# Patient Record
Sex: FEMALE | Race: BLACK OR AFRICAN AMERICAN | NOT HISPANIC OR LATINO | Employment: PART TIME | URBAN - METROPOLITAN AREA
[De-identification: names, ages, dates, MRNs, and addresses within clinical notes are randomized per-mention and may not be internally consistent; named-entity substitution may affect disease eponyms.]

---

## 2018-03-22 ENCOUNTER — HOSPITAL ENCOUNTER (EMERGENCY)
Facility: HOSPITAL | Age: 53
Discharge: HOME | End: 2018-03-23
Attending: EMERGENCY MEDICINE | Admitting: EMERGENCY MEDICINE
Payer: COMMERCIAL

## 2018-03-22 VITALS
OXYGEN SATURATION: 98 % | HEART RATE: 80 BPM | SYSTOLIC BLOOD PRESSURE: 124 MMHG | DIASTOLIC BLOOD PRESSURE: 68 MMHG | RESPIRATION RATE: 18 BRPM | TEMPERATURE: 98 F

## 2018-03-22 DIAGNOSIS — N39.0 URINARY TRACT INFECTION: Primary | ICD-10-CM

## 2018-03-22 DIAGNOSIS — R31.9 HEMATURIA: ICD-10-CM

## 2018-03-22 LAB
BACTERIA UR QL AUTO: ABNORMAL /HPF
BILIRUB UR QL STRIP: NEGATIVE
CLARITY UR: ABNORMAL
COLOR UR: ABNORMAL
GLUCOSE UR STRIP-MCNC: NEGATIVE MG/DL
HGB UR QL STRIP.AUTO: ABNORMAL
KETONES UR STRIP-MCNC: NEGATIVE MG/DL
LEUKOCYTE ESTERASE UR QL STRIP: ABNORMAL
NITRITE UR QL STRIP: NEGATIVE
NON-SQ EPI CELLS URNS QL MICRO: ABNORMAL /HPF
PH UR STRIP.AUTO: 6.5 [PH] (ref 4.5–8)
PROT UR STRIP-MCNC: ABNORMAL MG/DL
RBC #/AREA URNS AUTO: ABNORMAL /HPF
SP GR UR STRIP.AUTO: 1.02 (ref 1–1.03)
UROBILINOGEN UR QL STRIP.AUTO: 0.2 E.U./DL
WBC #/AREA URNS AUTO: ABNORMAL /HPF

## 2018-03-22 PROCEDURE — 87186 SC STD MICRODIL/AGAR DIL: CPT | Performed by: EMERGENCY MEDICINE

## 2018-03-22 PROCEDURE — 87086 URINE CULTURE/COLONY COUNT: CPT | Performed by: EMERGENCY MEDICINE

## 2018-03-22 PROCEDURE — 81001 URINALYSIS AUTO W/SCOPE: CPT | Performed by: EMERGENCY MEDICINE

## 2018-03-22 PROCEDURE — 87077 CULTURE AEROBIC IDENTIFY: CPT | Performed by: EMERGENCY MEDICINE

## 2018-03-22 RX ORDER — CEPHALEXIN 500 MG/1
500 CAPSULE ORAL 2 TIMES DAILY
Qty: 14 CAPSULE | Refills: 0 | Status: SHIPPED | OUTPATIENT
Start: 2018-03-22 | End: 2018-03-29

## 2018-03-22 RX ORDER — FAMOTIDINE 20 MG/1
20 TABLET, FILM COATED ORAL ONCE
Status: COMPLETED | OUTPATIENT
Start: 2018-03-22 | End: 2018-03-22

## 2018-03-22 RX ORDER — CEPHALEXIN 250 MG/1
500 CAPSULE ORAL ONCE
Status: COMPLETED | OUTPATIENT
Start: 2018-03-22 | End: 2018-03-22

## 2018-03-22 RX ORDER — FAMOTIDINE 20 MG/1
20 TABLET, FILM COATED ORAL 2 TIMES DAILY
Qty: 60 TABLET | Refills: 0 | Status: SHIPPED | OUTPATIENT
Start: 2018-03-22 | End: 2019-11-19

## 2018-03-22 RX ADMIN — CEPHALEXIN 500 MG: 250 CAPSULE ORAL at 23:51

## 2018-03-22 RX ADMIN — FAMOTIDINE 20 MG: 20 TABLET, FILM COATED ORAL at 23:51

## 2018-03-23 PROCEDURE — 99283 EMERGENCY DEPT VISIT LOW MDM: CPT

## 2018-03-23 NOTE — ED PROVIDER NOTES
History  Chief Complaint   Patient presents with    Blood in Urine     Pt c/o frequency and painful urination, blood in urine, lower abdominal pain   Hx of UTIS  59-year-old female with history of ulcerative colitis well controlled on Humira, does not smoke or drink is coming in with complaint of hematuria that started today  She has had some lower abdominal pressure with feeling that she needs to void but no actual abdominal pain and has had urinary urgency, frequency and hesitancy  Has sense of incomplete voiding like she could go to the bathroom again  Has had urinary tract infections in the past but none recently  Patient is on an immunologic and more prone to general infections  She has had no fever, chills, vomiting or diarrhea  She sees her GI doctor next week  Her only other complaint is some epigastric reflux symptoms that are worse with eating and drinking at times  Patient used to be on Protonix but stopped taking it because she did want to be on some new medications  Sometimes taking medications to worsen her reflux so we will put patient back on something like Pepcid  Urine dip in the emergency department shows large blood and leukocytes  She has no associated back or flank pain to consider kidney stone and has no associated abdominal tenderness  Discussed with her treatment for UTI and following up with her GI doctor and primary care doctor and worsening for any signs and symptoms to the emergency department  History provided by:  Patient  Blood in Urine   This is a new problem  The current episode started today  The problem is unchanged  She describes the hematuria as gross hematuria  The hematuria occurs throughout her entire urinary stream  She reports no clotting in her urine stream  The pain is mild  She describes her urine color as light pink  Irritative symptoms include frequency and urgency  Obstructive symptoms include incomplete emptying   Associated symptoms include dysuria and hesitancy  Pertinent negatives include no abdominal pain, chills, fever, flank pain, inability to urinate, nausea or vomiting  There is no history of kidney stones, recent infection or tobacco use  (On humira for UC)       None       Past Medical History:   Diagnosis Date    Ulcerative colitis ([facility] [address] )        History reviewed  No pertinent surgical history  History reviewed  No pertinent family history  I have reviewed and agree with the history as documented  Social History   Substance Use Topics    Smoking status: Never Smoker    Smokeless tobacco: Never Used    Alcohol use Yes      Comment: occ        Review of Systems   Constitutional: Negative for chills and fever  Gastrointestinal: Negative for abdominal pain, nausea and vomiting  Genitourinary: Positive for dysuria, frequency, hematuria, hesitancy, incomplete emptying and urgency  Negative for flank pain  All other systems reviewed and are negative  Physical Exam  ED Triage Vitals [03/22/18 2203]   Temperature Pulse Respirations Blood Pressure SpO2   98 °F (36 7 °C) 80 18 124/68 98 %      Temp Source Heart Rate Source Patient Position - Orthostatic VS BP Location FiO2 (%)   Oral Monitor Sitting Left arm --      Pain Score       --           Orthostatic Vital Signs  Vitals:    03/22/18 2203   BP: 124/68   Pulse: 80   Patient Position - Orthostatic VS: Sitting       Physical Exam   Constitutional: She appears well-developed and well-nourished  No distress  HENT:   Head: Normocephalic and atraumatic  Cardiovascular: Normal rate and regular rhythm  Pulmonary/Chest: Effort normal and breath sounds normal    Abdominal: Soft  Bowel sounds are normal  She exhibits no distension  There is no tenderness  Neurological: She is alert  Skin: She is not diaphoretic         ED Medications  Medications   famotidine (PEPCID) tablet 20 mg (not administered)   cephalexin (KEFLEX) capsule 500 mg (not administered)       Diagnostic Studies  Results Reviewed     Procedure Component Value Units Date/Time    UA w Reflex to Microscopic w Reflex to Culture [[id number]] Collected:  03/22/18 9601    Lab Status:  No result Specimen:  Urine from Urine, Clean Catch                  No orders to display              Procedures  Procedures       Phone Contacts  ED Phone Contact    ED Course  ED Course                                MDM  Number of Diagnoses or Management Options  Hematuria: new and requires workup  Urinary tract infection: new and requires workup     Amount and/or Complexity of Data Reviewed  Clinical lab tests: ordered and reviewed    Patient Progress  Patient progress: stable    CritCare Time    Disposition  Final diagnoses:   Urinary tract infection   Hematuria     Time reflects when diagnosis was documented in both MDM as applicable and the Disposition within this note     Time User Action Codes Description Comment    3/22/2018 11:26 PM Belgica Levnory FITO Add [N39 0] Urinary tract infection     3/22/2018 11:26 PM [address] [R31 9] Hematuria       ED Disposition     ED Disposition Condition Comment    Discharge  [facility] discharge to home/self care      Condition at discharge: Good        Follow-up Information     Follow up With Specialties Details Why Contact Info Additional Information    Keep your appointment GI next week         [facility] Emergency Department Emergency Medicine   [address] [address]  [phone] [place] ED, [address] [place] [address], [address]        Patient's Medications   Discharge Prescriptions    CEPHALEXIN (KEFLEX) 500 MG CAPSULE    Take 1 capsule (500 mg total) by mouth 2 (two) times a day for 7 days       Start Date: 3/22/2018 End Date: 3/29/2018       Order Dose: 500 mg       Quantity: 14 capsule    Refills: 0    FAMOTIDINE (PEPCID) 20 MG TABLET    Take 1 tablet (20 mg total) by mouth 2 (two) times a day for 30 days       Start Date: 3/22/2018 End Date: 4/21/2018       Order Dose: 20 mg       Quantity: 60 tablet    Refills: 0     No discharge procedures on file      ED Provider  Electronically Signed by           Aj Vargas MD  03/22/18 6893

## 2018-03-23 NOTE — DISCHARGE INSTRUCTIONS
Acute Hematuria   WHAT YOU SHOULD KNOW:   Hematuria is blood in your urine from an injury or medical condition  Acute means the problem starts suddenly, worsens quickly, and lasts a short time  Your urine may be bright red to dark brown  Some common causes of hematuria are bladder infection, kidney stone, trauma to the kidneys or bladder, and some medications  Sometimes blood clots can block the urethra so that you cannot empty your bladder  AFTER YOU LEAVE:   Medicines:  Ask about these or other medicines you may need to treat the cause of your acute hematuria:  · Antibiotics: This medicine is given to fight or prevent an infection caused by bacteria  Always take your antibiotics exactly as ordered by your healthcare provider  Do not stop taking your medicine unless directed by your healthcare provider  Never save antibiotics or take leftover antibiotics that were given to you for another illness  · Take your medicine as directed  Call your healthcare provider if you think your medicine is not helping or if you have side effects  Tell him if you are allergic to any medicine  Keep a list of the medicines, vitamins, and herbs you take  Include the amounts, and when and why you take them  Bring the list or the pill bottles to follow-up visits  Carry your medicine list with you in case of an emergency  Increase the amount of fluid you drink:  Drink clear fluids to help flush the blood from your urinary tract  Follow instructions about how much fluid to drink  Follow up with your healthcare provider as directed: Your healthcare provider will tell you how often to come in for follow-up visits  He may refer you to a specialist, such as a urologist or nephrologist  The specialists may do tests or procedures to find more serious problems with your urinary system  Write down your questions so you remember to ask them during your visits     Contact your healthcare provider if:   · You have a fever that gets worse or does not go away with treatment  · You cannot keep liquids or medicines down  · Your urine gets darker, even after you drink extra liquids  · You have questions or concerns about your condition, treatment, or care  Seek care immediately or call 911 if:   · You have blood in your urine after a new injury, such as a fall  · You are urinating very small amounts or not at all  · You feel like you cannot empty your bladder  · You have severe back or side pain that does not go away with treatment  © 2014 [address] is for End User's use only and may not be sold, redistributed or otherwise used for commercial purposes  All illustrations and images included in CareNotes® are the copyrighted property of A D A M , [employer]  or Mitchel [employer]  The above information is an  only  It is not intended as medical advice for individual conditions or treatments  Talk to your doctor, nurse or pharmacist before following any medical regimen to see if it is safe and effective for you  Urinary Tract Infection in Women, Ambulatory Care   GENERAL INFORMATION:   A urinary tract infection (UTI)  is caused by bacteria that get inside your urinary tract  Most bacteria that enter your urinary tract are expelled when you urinate  If the bacteria stay in your urinary tract, you may get an infection  Your urinary tract includes your kidneys, ureters, bladder, and urethra  Urine is made in your kidneys, and it flows from the ureters to the bladder  Urine leaves the bladder through the urethra  A UTI is more common in your lower urinary tract, which includes your bladder and urethra          Common symptoms include the following:   · Urinating more often or waking from sleep to urinate    · Pain or burning when you urinate    · Pain or pressure in your lower abdomen     · Urine that smells bad    · Blood in your urine    · Leaking urine  Seek immediate care for the following symptoms:   · Urinating very little or not at all    · Vomiting    · Shaking chills with a fever    · Side or back pain that gets worse  Treatment for a UTI  may include medicines to treat a bacterial infection  You may also need medicines to decrease pain and burning, or decrease the urge to urinate often  Prevent a UTI:   · Urinate when you feel the urge  Do not hold your urine  Urinate as soon as you feel you have to  · Drink liquids as directed  Ask how much liquid to drink each day and which liquids are best for you  You may need to drink more fluids than usual to help flush out the bacteria  Do not drink alcohol, caffeine, and citrus juices  These can irritate your bladder and increase your symptoms  · Apply heat  on your abdomen for 20 to 30 minutes every 2 hours for as many days as directed  Heat helps decrease discomfort and pressure in your bladder  Follow up with your healthcare provider as directed:  Write down your questions so you remember to ask them during your visits  CARE AGREEMENT:   You have the right to help plan your care  Learn about your health condition and how it may be treated  Discuss treatment options with your caregivers to decide what care you want to receive  You always have the right to refuse treatment  The above information is an  only  It is not intended as medical advice for individual conditions or treatments  Talk to your doctor, nurse or pharmacist before following any medical regimen to see if it is safe and effective for you  © 2014 [address] is for End User's use only and may not be sold, redistributed or otherwise used for commercial purposes  All illustrations and images included in CareNotes® are the copyrighted property of A D A M , Inc  or Mitchel Leonard

## 2018-03-25 LAB — BACTERIA UR CULT: ABNORMAL

## 2019-01-14 ENCOUNTER — TELEPHONE (OUTPATIENT)
Dept: GASTROENTEROLOGY | Facility: CLINIC | Age: 54
End: 2019-01-14

## 2019-01-14 NOTE — TELEPHONE ENCOUNTER
Attempted to contact pt to cancel appt on 1/16/19 do to credentialing problem , unable to reach v/m full

## 2019-01-16 ENCOUNTER — TELEPHONE (OUTPATIENT)
Dept: GASTROENTEROLOGY | Facility: CLINIC | Age: 54
End: 2019-01-16

## 2019-01-16 NOTE — TELEPHONE ENCOUNTER
PT SWITCHED INSURANCE POLICIES FROM [employer] TO [employer]   SHES GOING TO NEED A NEW PA FOR HUMIRA STARTED PLEASE

## 2019-01-23 ENCOUNTER — TELEPHONE (OUTPATIENT)
Dept: GASTROENTEROLOGY | Facility: CLINIC | Age: 54
End: 2019-01-23

## 2019-01-23 NOTE — TELEPHONE ENCOUNTER
Could not LMOM pt VM box is full  Tried to call pt and advise her that we were waiting for paper work and that her PA was submitted, hopefully we hear back by the end of the week

## 2019-01-30 ENCOUNTER — TELEPHONE (OUTPATIENT)
Dept: GASTROENTEROLOGY | Facility: CLINIC | Age: 54
End: 2019-01-30

## 2019-01-30 DIAGNOSIS — K51.00 ULCERATIVE PANCOLITIS WITHOUT COMPLICATION (HCC): Primary | ICD-10-CM

## 2019-01-30 NOTE — TELEPHONE ENCOUNTER
Pt called, she got notification that her Humira was approved  Can you send the script to the mail order pharmacy that works with [address] [place]  Pt cannot remember the name of the pharmacy   Thank you

## 2019-01-31 ENCOUNTER — TELEPHONE (OUTPATIENT)
Dept: GASTROENTEROLOGY | Facility: CLINIC | Age: 54
End: 2019-01-31

## 2019-01-31 NOTE — TELEPHONE ENCOUNTER
Dr Areli Red Patient called  The Pharmacy patient would like to have prescription for humira sent to is [facility] [phone]   Any questions, please call patient at [phone]

## 2019-01-31 NOTE — TELEPHONE ENCOUNTER
[facility] number to call is [phone] to set up delivery to pt  Verbal was given (to pharmacy voice mail as per transferred since no pharmacist was available )  Humira pen 40 mb/0 08 ml , qty 2, duration, 28 days approved from 1/24/19 to 1/24/20  Also,LM with office info to call if any question

## 2019-02-01 ENCOUNTER — TELEPHONE (OUTPATIENT)
Dept: GASTROENTEROLOGY | Facility: CLINIC | Age: 54
End: 2019-02-01

## 2019-02-01 NOTE — TELEPHONE ENCOUNTER
Dr Marcial Ayala called  Needs further action in regard to patient's Humira Medication  Pharmacy is requesting the following:   [de-identified] NPI Number, Address and a detailed prescription along with a  VERBAL authorization    Please call Ciera Avila at [phone] ty

## 2019-02-04 ENCOUNTER — TELEPHONE (OUTPATIENT)
Dept: GASTROENTEROLOGY | Facility: CLINIC | Age: 54
End: 2019-02-04

## 2019-02-04 NOTE — TELEPHONE ENCOUNTER
Called pt and LMOM advising that I spoke to [facility]  and provided a verbal script for her Humira medication

## 2019-02-04 NOTE — TELEPHONE ENCOUNTER
Dr Sisi Camejo - Patient called  [facility] has to get a verbal to fill humira prescription for patient  [phone]   Please call patient [phone]

## 2019-02-04 NOTE — TELEPHONE ENCOUNTER
Spoke to [employer]  and provided verbal script of Humira 40 mg/ 0 8 ml SQ Inject every other week with 11 refills      Approval range 1/24/19 - 1/19/20

## 2019-02-22 RX ORDER — ALPRAZOLAM 0.5 MG/1
0.5 TABLET ORAL 3 TIMES DAILY PRN
COMMUNITY
Start: 2018-08-14 | End: 2019-11-19

## 2019-02-22 RX ORDER — PANTOPRAZOLE SODIUM 40 MG/1
40 TABLET, DELAYED RELEASE ORAL
COMMUNITY
Start: 2016-12-14 | End: 2019-07-19 | Stop reason: SDUPTHER

## 2019-02-27 ENCOUNTER — OFFICE VISIT (OUTPATIENT)
Dept: GASTROENTEROLOGY | Facility: CLINIC | Age: 54
End: 2019-02-27
Payer: COMMERCIAL

## 2019-02-27 VITALS — SYSTOLIC BLOOD PRESSURE: 122 MMHG | HEART RATE: 72 BPM | WEIGHT: 127.2 LBS | DIASTOLIC BLOOD PRESSURE: 84 MMHG

## 2019-02-27 DIAGNOSIS — K21.9 GASTROESOPHAGEAL REFLUX DISEASE WITHOUT ESOPHAGITIS: ICD-10-CM

## 2019-02-27 DIAGNOSIS — R14.0 BLOATING: Primary | ICD-10-CM

## 2019-02-27 DIAGNOSIS — K51.919 ULCERATIVE COLITIS WITH COMPLICATION, UNSPECIFIED LOCATION (HCC): ICD-10-CM

## 2019-02-27 PROCEDURE — 99214 OFFICE O/P EST MOD 30 MIN: CPT | Performed by: INTERNAL MEDICINE

## 2019-02-27 RX ORDER — CLOBETASOL PROPIONATE 0.5 MG/G
CREAM TOPICAL
COMMUNITY

## 2019-02-27 RX ORDER — NAPROXEN 500 MG/1
TABLET ORAL
COMMUNITY
End: 2019-11-19

## 2019-02-27 NOTE — PROGRESS NOTES
[facility] - Outpatient Follow-up Note  Anika Hearing 48 y o  female MRN: [id number]  Encounter: [id number]          ASSESSMENT AND PLAN:      1  Bloating      2  Ulcerative colitis with complication, unspecified location ([facility] )      3  Gastroesophageal reflux disease without esophagitis    - Continue Humira  - Continue Lomotil PRN  - Will see her back in 3-4 months   ______________________________________________________________________    SUBJECTIVE:    48year old female with UC who is doing well on Humira every other week  She is reporting bloating and gas and she is going to restart her probiotic  She is denying rectal bleeding or melena  No nausea or vomiting  Her weight is stable  REVIEW OF SYSTEMS IS OTHERWISE NEGATIVE  Historical Information   Past Medical History:   Diagnosis Date    Anxiety     Colitis     Ulcerative colitis ([place] [address] )      Past Surgical History:   Procedure Laterality Date    COLONOSCOPY       Social History   Social History     Substance and Sexual Activity   Alcohol Use Yes    Comment: occ     Social History     Substance and Sexual Activity   Drug Use No     Social History     Tobacco Use   Smoking Status Never Smoker   Smokeless Tobacco Never Used     Family History   Problem Relation Age of Onset    No Known Problems Mother     Stroke Father     Hypertension Father        Meds/Allergies       Current Outpatient Medications:     Adalimumab (HUMIRA PEN) 40 MG/0 8ML PNKT    ALPRAZolam (XANAX) 0 5 mg tablet    clobetasol (TEMOVATE) 0 05 % cream    Diphenoxylate-Atropine (LOMOTIL PO)    naproxen (NAPROSYN) 500 mg tablet    pantoprazole (PROTONIX) 40 mg tablet    adalimumab (HUMIRA) 40 mg/0 8 mL PSKT    famotidine (PEPCID) 20 mg tablet    Allergies   Allergen Reactions    Thimerosal Hives           Objective     Blood pressure 122/84, pulse 72, weight 57 7 kg (127 lb 3 2 oz)   There is no height or weight on file to calculate BMI       PHYSICAL EXAM:      General Appearance:   Alert, cooperative, no distress   HEENT:   Normocephalic, atraumatic, anicteric      Neck:  Supple, symmetrical, trachea midline   Lungs:   Clear to auscultation bilaterally; no rales, rhonchi or wheezing; respirations unlabored    Heart[de-identified]   Regular rate and rhythm; no murmur, rub, or gallop  Abdomen:   Soft, non-tender, non-distended; normal bowel sounds; no masses, no organomegaly    Genitalia:   Deferred    Rectal:   Deferred    Extremities:  No cyanosis, clubbing or edema    Pulses:  2+ and symmetric    Skin:  No jaundice, rashes, or lesions    Lymph nodes:  No palpable cervical lymphadenopathy        Lab Results:   No visits with results within 1 Day(s) from this visit  Latest known visit with results is:   Admission on 03/22/2018, Discharged on 03/23/2018   Component Date Value    Color, UA 03/22/2018 Red     Clarity, UA 03/22/2018 Cloudy     Specific Gravity, UA 03/22/2018 1 025     pH, UA 03/22/2018 6 5     Leukocytes, UA 03/22/2018 Large*    Nitrite, UA 03/22/2018 Negative     Protein, UA 03/22/2018 30 (1+)*    Glucose, UA 03/22/2018 Negative     Ketones, UA 03/22/2018 Negative     Urobilinogen, UA 03/22/2018 0 2     Bilirubin, UA 03/22/2018 Negative     Blood, UA 03/22/2018 Large*    RBC, UA 03/22/2018 Innumerable*    WBC, UA 03/22/2018 10-20*    Epithelial Cells 03/22/2018 Occasional     Bacteria, UA 03/22/2018 Moderate*    Urine Culture 03/22/2018 80,000-89,000 cfu/ml Escherichia coli*         Radiology Results:   No results found

## 2019-02-27 NOTE — LETTER
February 27, 2019     Les Perez PA-C  [address]  [address] [address]    Patient: Lizette Sanon   YOB: 1965   Date of Visit: 2/27/2019       Dear Dr Dewayne Toney: Thank you for referring Lizette Sanon to me for evaluation  Below are my notes for this consultation  If you have questions, please do not hesitate to call me  I look forward to following your patient along with you  Sincerely,        Landry Sharp DO        CC: No Recipients  Landry Sharp DO  2/27/2019  9:43 AM  Sign at close encounter  [facility] - Outpatient Follow-up Note  Lizette Sanon 48 y o  female MRN: [id number]  Encounter: [id number]          ASSESSMENT AND PLAN:      1  Bloating      2  Ulcerative colitis with complication, unspecified location ([place] [address] )      3  Gastroesophageal reflux disease without esophagitis    - Continue Humira  - Continue Lomotil PRN  - Will see her back in 3-4 months   ______________________________________________________________________    SUBJECTIVE:    48year old female with UC who is doing well on Humira every other week  She is reporting bloating and gas and she is going to restart her probiotic  She is denying rectal bleeding or melena  No nausea or vomiting  Her weight is stable  REVIEW OF SYSTEMS IS OTHERWISE NEGATIVE        Historical Information   Past Medical History:   Diagnosis Date    Anxiety     Colitis     Ulcerative colitis ([facility] )      Past Surgical History:   Procedure Laterality Date    COLONOSCOPY       Social History   Social History     Substance and Sexual Activity   Alcohol Use Yes    Comment: occ     Social History     Substance and Sexual Activity   Drug Use No     Social History     Tobacco Use   Smoking Status Never Smoker   Smokeless Tobacco Never Used     Family History   Problem Relation Age of Onset    No Known Problems Mother     Stroke Father     Hypertension Father        Meds/Allergies Current Outpatient Medications:     Adalimumab (HUMIRA PEN) 40 MG/0 8ML PNKT    ALPRAZolam (XANAX) 0 5 mg tablet    clobetasol (TEMOVATE) 0 05 % cream    Diphenoxylate-Atropine (LOMOTIL PO)    naproxen (NAPROSYN) 500 mg tablet    pantoprazole (PROTONIX) 40 mg tablet    adalimumab (HUMIRA) 40 mg/0 8 mL PSKT    famotidine (PEPCID) 20 mg tablet    Allergies   Allergen Reactions    Thimerosal Hives           Objective     Blood pressure 122/84, pulse 72, weight 57 7 kg (127 lb 3 2 oz)  There is no height or weight on file to calculate BMI  PHYSICAL EXAM:      General Appearance:   Alert, cooperative, no distress   HEENT:   Normocephalic, atraumatic, anicteric      Neck:  Supple, symmetrical, trachea midline   Lungs:   Clear to auscultation bilaterally; no rales, rhonchi or wheezing; respirations unlabored    Heart[de-identified]   Regular rate and rhythm; no murmur, rub, or gallop  Abdomen:   Soft, non-tender, non-distended; normal bowel sounds; no masses, no organomegaly    Genitalia:   Deferred    Rectal:   Deferred    Extremities:  No cyanosis, clubbing or edema    Pulses:  2+ and symmetric    Skin:  No jaundice, rashes, or lesions    Lymph nodes:  No palpable cervical lymphadenopathy        Lab Results:   No visits with results within 1 Day(s) from this visit     Latest known visit with results is:   Admission on 03/22/2018, Discharged on 03/23/2018   Component Date Value    Color, UA 03/22/2018 Red     Clarity, UA 03/22/2018 Cloudy     Specific Gravity, UA 03/22/2018 1 025     pH, UA 03/22/2018 6 5     Leukocytes, UA 03/22/2018 Large*    Nitrite, UA 03/22/2018 Negative     Protein, UA 03/22/2018 30 (1+)*    Glucose, UA 03/22/2018 Negative     Ketones, UA 03/22/2018 Negative     Urobilinogen, UA 03/22/2018 0 2     Bilirubin, UA 03/22/2018 Negative     Blood, UA 03/22/2018 Large*    RBC, UA 03/22/2018 Innumerable*    WBC, UA 03/22/2018 10-20*    Epithelial Cells 03/22/2018 Occasional     Bacteria, UA 03/22/2018 Moderate*    Urine Culture 03/22/2018 80,000-89,000 cfu/ml Escherichia coli*         Radiology Results:   No results found

## 2019-05-15 ENCOUNTER — OFFICE VISIT (OUTPATIENT)
Dept: GASTROENTEROLOGY | Facility: CLINIC | Age: 54
End: 2019-05-15
Payer: COMMERCIAL

## 2019-05-15 VITALS
HEIGHT: 59 IN | WEIGHT: 125.8 LBS | DIASTOLIC BLOOD PRESSURE: 78 MMHG | SYSTOLIC BLOOD PRESSURE: 112 MMHG | BODY MASS INDEX: 25.36 KG/M2 | HEART RATE: 66 BPM

## 2019-05-15 DIAGNOSIS — K51.919 ULCERATIVE COLITIS WITH COMPLICATION, UNSPECIFIED LOCATION (HCC): Primary | ICD-10-CM

## 2019-05-15 PROCEDURE — 99213 OFFICE O/P EST LOW 20 MIN: CPT | Performed by: PHYSICIAN ASSISTANT

## 2019-05-15 RX ORDER — IBUPROFEN 600 MG/1
TABLET ORAL
Refills: 0 | COMMUNITY
Start: 2019-04-11

## 2019-05-15 RX ORDER — HYDROCODONE BITARTRATE AND ACETAMINOPHEN 5; 325 MG/1; MG/1
1 TABLET ORAL EVERY 4 HOURS PRN
Refills: 0 | COMMUNITY
Start: 2019-04-11 | End: 2019-11-19

## 2019-07-19 ENCOUNTER — TELEPHONE (OUTPATIENT)
Dept: GASTROENTEROLOGY | Facility: CLINIC | Age: 54
End: 2019-07-19

## 2019-07-19 DIAGNOSIS — K21.9 GASTROESOPHAGEAL REFLUX DISEASE, ESOPHAGITIS PRESENCE NOT SPECIFIED: Primary | ICD-10-CM

## 2019-07-19 RX ORDER — PANTOPRAZOLE SODIUM 40 MG/1
40 TABLET, DELAYED RELEASE ORAL DAILY
Qty: 90 TABLET | Refills: 3 | Status: SHIPPED | OUTPATIENT
Start: 2019-07-19 | End: 2020-04-01 | Stop reason: SDUPTHER

## 2019-08-20 ENCOUNTER — OFFICE VISIT (OUTPATIENT)
Dept: OTOLARYNGOLOGY | Facility: CLINIC | Age: 54
End: 2019-08-20
Payer: COMMERCIAL

## 2019-08-20 VITALS
HEART RATE: 62 BPM | HEIGHT: 59 IN | BODY MASS INDEX: 25.4 KG/M2 | DIASTOLIC BLOOD PRESSURE: 72 MMHG | WEIGHT: 126 LBS | SYSTOLIC BLOOD PRESSURE: 118 MMHG

## 2019-08-20 DIAGNOSIS — M26.609 TMJ (TEMPOROMANDIBULAR JOINT SYNDROME): ICD-10-CM

## 2019-08-20 DIAGNOSIS — R49.0 DYSPHONIA: Primary | ICD-10-CM

## 2019-08-20 DIAGNOSIS — R49.8 VOICE STRAIN: ICD-10-CM

## 2019-08-20 PROCEDURE — 99243 OFF/OP CNSLTJ NEW/EST LOW 30: CPT | Performed by: OTOLARYNGOLOGY

## 2019-08-20 PROCEDURE — 31575 DIAGNOSTIC LARYNGOSCOPY: CPT | Performed by: OTOLARYNGOLOGY

## 2019-08-20 NOTE — PROGRESS NOTES
Consultation - Otolaryngology - Head and Neck Surgery  Facial Plastic and Reconstructive Surgery  [facility] 48 y o  female MRN: [id number]  Encounter: [id number]        Assessment/Plan:  1  Dysphonia  Ambulatory referral to Speech Therapy   2  Voice strain     3  TMJ (temporomandibular joint syndrome)           Temporomandibular joint syndrome: We discussed soft diet for 2 weeks, appropriate use of NSAIDs for 1 week, warm compresses, massage and PT for TMJ therapy  Will refer to PT for TMJ and follow up PRN  Laryngoscopy today well appearing with erythema of the arytenoids and vocal cords  Otherwise no nodules or polyps  Discussed that symptoms are likely due to heavy voice use  Recommend voice rest and speech therapy  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 in a few weeks and will proceed with speech therapy around that time  Follow up in 3-4 months for re evaluation, sooner with any additional concerns  History of Present Illness   Physician Requesting Consult: Ericka Nieto PA-C  Reason for Consult / Principal Problem: Voice  HPI: [facility] is a 48y o  year old female who presents with loss of voice  She states symptoms have been ongoing for some time now  She works at [employer] and has heavy voice use while rafting and speaking on the phone  She relates that she had similar issues in the past over 10 years ago and had informal speech therapy which seemed to help her symptoms  She also relates intermittent bilateral ear pain  She admits to frequent jaw clenching with stress and anxiety  No otorrhea or hearing changes  Review of systems:  ROS was performed by the MA and documented in the attached note  This was reviewed personally      Historical Information   Past Medical History:   Diagnosis Date    Anxiety     Colitis     Ulcerative colitis ([facility] [address] )      Past Surgical History:   Procedure Laterality Date    COLONOSCOPY      TOOTH EXTRACTION       Social History Social History     Substance and Sexual Activity   Alcohol Use Yes    Comment: occ     Social History     Substance and Sexual Activity   Drug Use No     Social History     Tobacco Use   Smoking Status Never Smoker   Smokeless Tobacco Never Used     Family History:   Family History   Problem Relation Age of Onset    No Known Problems Mother     Stroke Father     Hypertension Father        Current Outpatient Medications on File Prior to Visit   Medication Sig    Adalimumab (HUMIRA PEN) 40 MG/0 8ML PNKT     ALPRAZolam (XANAX) 0 5 mg tablet Take 0 5 mg by mouth Three times daily as needed    clobetasol (TEMOVATE) 0 05 % cream clobetasol 0 05 % topical cream    Diphenoxylate-Atropine (LOMOTIL PO) Lomotil   QHS    famotidine (PEPCID) 20 mg tablet Take 1 tablet (20 mg total) by mouth 2 (two) times a day for 30 days    ibuprofen (MOTRIN) 600 mg tablet take 1 tablet by mouth every 6 hours for THE FIRST 48 HOURS then if needed for pain    pantoprazole (PROTONIX) 40 mg tablet Take 1 tablet (40 mg total) by mouth daily    adalimumab (HUMIRA) 40 mg/0 8 mL PSKT Inject 0 8 mL (40 mg total) under the skin once for 1 dose    HYDROcodone-acetaminophen (NORCO) 5-325 mg per tablet Take 1 tablet by mouth every 4 (four) hours as needed    naproxen (NAPROSYN) 500 mg tablet naproxen 500 mg tablet     No current facility-administered medications on file prior to visit  Allergies   Allergen Reactions    Thimerosal Hives       Vitals:    08/20/19 1011   BP: 118/72   Pulse: 62       Physical Exam   Constitutional: Oriented to person, place, and time  Well-developed and well-nourished, no apparent distress, non-toxic appearance  Cooperative, able to hear and answer questions without difficulty  Voice: Normal voice quality  Head: Normocephalic, atraumatic  No scars, masses or lesions  Face: Symmetric, no edema, no sinus tenderness  Eyes: Vision grossly intact, extra-ocular movement intact    Ears: External ears normal  Tympanic membranes intact with intact normal landmarks  No post-auricular erythema or tenderness  Nose: Septum intact, nares clear  Mucosa moist, turbinates well appearing  No crusting, polyps or discharge evident  Oral cavity: Dentition intact  Bilateral linea alba of buccal mucosa  Mucosa moist, lips without lesions or masses  Tongue mobile, floor of mouth soft and flat  Hard palate intact  No masses or lesions  Oropharynx: Uvula is midline, soft palate intact without lesion or mass  Oropharyngeal inlet without obstruction  Tonsils unremarkable  Posterior pharyngeal wall clear  No masses or lesions  Salivary glands:  Parotid glands and submandibular glands symmetric, no enlargement or tenderness  Neck: Normal laryngeal elevation with swallow  Trachea midline  No masses or lesions  No palpable adenopathy  Thyroid: Without tenderness or palpable nodules  Pulmonary/Chest: Normal effort and rate  No respiratory distress  No stertor or stridor  Musculoskeletal: Normal range of motion  Neurological: Cranial nerves 2-12 intact  Skin: Skin is warm and dry  Psychiatric: Normal mood and affect  Procedure: Flexible fiberoptic laryngoscopy    Indications: Evaluate areas of the upper aerodigestive tract not seen on physical exam    Procedure in detail: After informed verbal consent was obtained the nose was anesthetized using 4% lidocaine and neosynephrine spray  After adequate time for anesthesia the scope was guided easily along the nasal cavity floor and into the nasopharynx  The nasopharynx, oropharynx, hypopharynx and larynx were evaluated with the below listed findings  The scope was removed without difficulty and the patient tolerated the procedure well  Findings: No significant mucoid purulence or polyposis in the nasal cavity  No lesions or masses of the nasopharynx, oropharynx, hypopharynx, or larynx  Bilateral vocal folds are fully mobile  Airway is widely patent   No lesions or masses of the subglottis  Bilateral arytenoids erythematous  Imaging Studies: I have personally reviewed pertinent reports  Lab Results: I have personally reviewed pertinent lab results  Greater than 40 minutes were spent in consultation  More than 1/2 of that time was spent in counselling and coordination of care

## 2019-08-20 NOTE — PROGRESS NOTES
Review of Systems   Constitutional: Negative  HENT: Positive for ear pain, sore throat, trouble swallowing and voice change  Eyes: Negative  Respiratory: Negative  Cardiovascular: Negative  Gastrointestinal: Negative  Endocrine: Negative  Genitourinary: Negative  Musculoskeletal: Negative  Skin: Negative  Allergic/Immunologic: Positive for food allergies (dairy)  Negative for environmental allergies  Neurological: Positive for dizziness and headaches  Hematological: Negative  Psychiatric/Behavioral: Negative

## 2019-10-03 DIAGNOSIS — R49.0 DYSPHONIA: Primary | ICD-10-CM

## 2019-10-10 ENCOUNTER — EVALUATION (OUTPATIENT)
Dept: SPEECH THERAPY | Facility: CLINIC | Age: 54
End: 2019-10-10
Payer: COMMERCIAL

## 2019-10-10 DIAGNOSIS — R49.0 DYSPHONIA: ICD-10-CM

## 2019-10-10 PROCEDURE — 92524 BEHAVRAL QUALIT ANALYS VOICE: CPT

## 2019-10-10 NOTE — PROGRESS NOTES
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 and began losing her voice, she also experienced vocal pain  She saw an ENT at that time and had an endoscopy performed  It was determined that she had vocal nodules  She had speech therapy at that time and her symptoms resolved  In  she began working as a  at [employer], 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[employer] [place]  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  Currently she speaks in multiple capacities including large groups, speeches, phone calls, and year round vocal demand  Her speaking demands include projection and increased loudness for river guide work  Prior Functional Status:  She reports that her vocal quality in session today has improved to a place where she is less hoarse and in less overall pain  She has been without guiding demands for about one month  She also goes without greeting obligations per her boss when her voice is unable to do so  Medical History significant for:   Past Medical History:   Diagnosis Date    Anxiety     Colitis     Ulcerative colitis ([facility] [address] )      Clinically Complex Situations:  Patient reports that she will be working weekends only from this weekend until after [place]  Hearing:  WFL  Vision:  Patient wears glasses      Medication List:   Current Outpatient Medications   Medication Sig Dispense Refill    Adalimumab (HUMIRA PEN) 40 MG/0 8ML PNKT       adalimumab (HUMIRA) 40 mg/0 8 mL PSKT Inject 0 8 mL (40 mg total) under the skin once for 1 dose 0 4 mL 0    ALPRAZolam (XANAX) 0 5 mg tablet Take 0 5 mg by mouth Three times daily as needed      clobetasol (TEMOVATE) 0 05 % cream clobetasol 0 05 % topical cream      Diphenoxylate-Atropine (LOMOTIL PO) Lomotil   QHS      famotidine (PEPCID) 20 mg tablet Take 1 tablet (20 mg total) by mouth 2 (two) times a day for 30 days 60 tablet 0    HYDROcodone-acetaminophen (NORCO) 5-325 mg per tablet Take 1 tablet by mouth every 4 (four) hours as needed  0    ibuprofen (MOTRIN) 600 mg tablet take 1 tablet by mouth every 6 hours for THE FIRST 48 HOURS then if needed for pain  0    naproxen (NAPROSYN) 500 mg tablet naproxen 500 mg tablet      pantoprazole (PROTONIX) 40 mg tablet Take 1 tablet (40 mg total) by mouth daily 90 tablet 3     No current facility-administered medications for this visit  Allergies: Allergies   Allergen Reactions    Thimerosal Hives     Primary Language: English    Home Environment/ Lifestyle:  Patient reports that she lives with a dog and a cat  She sold her home in June and moved into a small apartment here in [place]  Current / Prior Services being received:  Patient reporting she had speech therapy [id number] and has not had speech therapy services  Mental Status:  [facility]  Behavior Status:  [facility]  Communication Modalities:  Expressive language  Recent Speech/ Language therapy:  None since 2002  Rehabilitation Prognosis:  Good    VOICE EVALUATION:  -Onset: 2001     -Voice history:  Nodules in [id number], resolved  Patient presents with similar symptoms yearly     -Occupational/vocal demands:  Patient's vocal demand is very high, see summary     -Water intake:  Patient reports that she is "terrible" at drinking enough water    She is aware of it and does well throughout the summer     -Caffeine intake:  Patient reports that she has 1-2 cups of coffee per day  -Alcohol intake:  Patient reports that her boyfriend brews beer at home but recently has been on an alcohol hiatus at this time     -Smoking:  Never    -Throat clearing/coughing:  Patient reports that she does not at this time but throughout the summer she does  -Previous voice tx?:  2002 for a short period of time, her vocal nodules receded at that time  She was noted to have vocal demands and decreased breath support  1  Voice Handicap Index (VHI): The VHI is a list of 30 statements that many people have used to describe their voices and the effects of their voices on their lives  Patient indicated how frequently she has the same experience using a rating point scale (never = 0, almost never = 1, sometimes = 2, almost always = 3, and always = 4)  Goal will be established  2  Consensus Auditory Perceptual Evaluation of Voice (CAPE-V): The CAPE-V rates auditory-perceptual qualities of a patients voice  The overall severity, roughness, breathiness, strain, pitch and loudness are rated during several tasks including general conversation, vowel prolongation, and reading of sentences  Patient also read the [place] to assess the coordination of respiration and voice production  The ratings of the abovementioned parameters were plotted on a 100-millimeter scale, with 0 corresponding to typical normal voice and 100 indicating a moderate-severely deviant voice      Parameter: Rating: Severity & Perceptual Observations:   *Overall Severity Roughness 40/100 moderate   Roughness 40/100 moderate   Breathiness 70/100 Moderate-severe   Strain 80/100 Moderate-severe   Pitch 60/100 moderate   Loudness 50/100 moderate   Focus of Resonance Oral moderate       RESPIRATORY EFFICIENCY:   3  S:Z Ratio Task: Patient was instructed to sustain the sounds /s/ and /z/ across three trials to examine the coordination and efficiency of respiration and voice production  Normative data suggests that adults can prolong these sounds for 20-25 seconds  Ratios of 1 4 and above are consistent with laryngeal inefficiency, and ratios of 2 0 and above are suggestive of vocal fold pathology  Task: Trial 1 (sec): Trial 2 (sec): Trial 3 (sec):   /s/ 32 30 30   /z/ 12 21 12 53 12 41     Best /s/:  32  Best /z/:  12 53     Ratio:  2 55      4  Maximum Phonation Time: Patient was instructed to sustain the sound /ah/ across three trials to measure respiratory and laryngeal coordination and efficiency  Adults are typically able to prolong these vowels sound for 15-20 seconds  Reduced MPT may suggest poor respiratory support, or poor medial glottal closure  Trial 1 (sec): Trial 2 (sec): Trial 3 (sec):   5 89 6 75 6 30     Average Duration: 6 31 seconds      Goals  Short Term Goals:  1  Patient will complete voice handicap index in order to determine baseline impact  2   Patient will be educated on effects on the voice including medication, GERD, diet, lifestyle and behavior  3   Patient will be provided exercises in order to decrease overall muscle tension  4   Patient will be educated on breathing exercises appropriate for relaxation of speaking muscles  5   Patient will be evaluated as a candidate for myofacial release therapy 2' reported pain, tenderness and soreness in the cervical neck/spine  6   Patient will be taught [address]  93  figures of retraction and anchor for appropriate speaking voice  7   Patient will discuss work modifications to assist in vocal recovery  Long Term Goals:  1  Patient will improve her overall vocal quality to baseline measure  Impressions/ Recommendations  Patient presents with a moderate-severely deviant vocal quality today which as reported by her has "improved a lot" since labor day weekend as her work load has decreased  Patient demonstrates breathy, strained, tight vocal quality with intermittent hoarseness and pitch breaks    She describes her voice as being painful, sore and that it "doesn't recover" the way it used to in the past   Through past medical history gathered today it is evident the patient has undergone vocal trauma at least 18+ years, likely longer  Her poor vocal hygiene coupled with chronic vocal abuse has given way to at least one known case of vocal cord nodules and likely some form of polyps, varices or scarring 2' vocal quality  In evaluation today, patient was found to be very tight, compensating to amplify her voice, something her job requires a great deal of in the busy season  Patient was educated on various vocal abuse behaviors and vocal hygiene throughout assessment  Vocal abuses included decreased water and increased caffeine intake, coughing and throat-clearing, thoracic/clavicular breathing, straining voice, whispering  Vocal hygiene strategies included increased water intake, decreased caffeine intake, throat-clearing awareness, increased breath support/diaphragmatic breathing, compensatory strategies to minimize vocal abuses during work day, confidential voice  Patient was agreeable       Recommendations:   Patients would benefit from: Voice therapy   Frequency:1-2x weekly   Duration:4-5 weeks    Intervention certification from:  62/58/78  Intervention certification to:  64/53/81

## 2019-10-17 ENCOUNTER — OFFICE VISIT (OUTPATIENT)
Dept: SPEECH THERAPY | Facility: CLINIC | Age: 54
End: 2019-10-17
Payer: COMMERCIAL

## 2019-10-17 DIAGNOSIS — R49.0 DYSPHONIA: Primary | ICD-10-CM

## 2019-10-17 PROCEDURE — 92507 TX SP LANG VOICE COMM INDIV: CPT

## 2019-10-17 NOTE — PROGRESS NOTES
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 and "lost her voice" however by  she was no longer guiding and reports that her voice had "mostly recovered" by Monday  Objective:  1  Patient will complete voice handicap index in order to determine baseline impact  10/17:  Goal met  Baseline measures of VHI were Functional-10 Physical-25 Emotional-9  2  Patient will be educated on effects on the voice including medication, GERD, diet, lifestyle and behavior  10/17:  Goal met, education and hand outs provided  3   Patient will be provided exercises in order to decrease overall muscle tension  10/17:  Patient provided cervical spine exercises to reduce tension  4   Patient will be educated on breathing exercises appropriate for relaxation of speaking muscles  10/17:  Patient provided exercises to assist in relaxation technique and breath support  5   Patient will be evaluated as a candidate for myofacial release therapy 2' reported pain, tenderness and soreness in the cervical neck/spine  6   Patient will be taught [address]  figures of retraction and anchor for appropriate speaking voice  7   Patient will discuss work modifications to assist in vocal recovery  Assessment:  Patient revealing in conversation today she suffers from a lot of neck and shoulder pain 2' job requirements    Patient paddles, moves boats with people in them, manipulates tubes, etc  She reports that she is always "tender" near her clavicles  Patient reporting that she does use medications on the list and consume items on the GERD diet precautions list   She reports that she avoids most of these items 2' GI history and health  Patient has a great deal of stress and anxiety, medicated, that can be contributing to her overall vocal health/quality      Plan:Voice therapy   Frequency:1-2x weekly   Duration:4-5 weeks    Homework:  10/17:  Cervical exercises    Intervention Cycle:  Intervention certification from:  71/78/66  Intervention certification to:  74/14/82    Visit: 2 / 24

## 2019-11-19 ENCOUNTER — OFFICE VISIT (OUTPATIENT)
Dept: OTOLARYNGOLOGY | Facility: CLINIC | Age: 54
End: 2019-11-19
Payer: COMMERCIAL

## 2019-11-19 VITALS
BODY MASS INDEX: 25.8 KG/M2 | DIASTOLIC BLOOD PRESSURE: 70 MMHG | SYSTOLIC BLOOD PRESSURE: 122 MMHG | WEIGHT: 128 LBS | HEIGHT: 59 IN | HEART RATE: 67 BPM

## 2019-11-19 DIAGNOSIS — K21.9 GASTROESOPHAGEAL REFLUX DISEASE, ESOPHAGITIS PRESENCE NOT SPECIFIED: ICD-10-CM

## 2019-11-19 DIAGNOSIS — R49.0 DYSPHONIA: Primary | ICD-10-CM

## 2019-11-19 DIAGNOSIS — R49.8 VOICE STRAIN: ICD-10-CM

## 2019-11-19 PROCEDURE — 99213 OFFICE O/P EST LOW 20 MIN: CPT | Performed by: OTOLARYNGOLOGY

## 2019-11-19 RX ORDER — CLONAZEPAM 0.5 MG/1
0.5 TABLET ORAL
Refills: 0 | COMMUNITY
Start: 2019-11-13

## 2019-11-19 RX ORDER — VENLAFAXINE 75 MG/1
75 TABLET ORAL DAILY
Refills: 0 | COMMUNITY
Start: 2019-11-13 | End: 2021-08-10

## 2019-11-19 NOTE — PROGRESS NOTES
Gosia Serrano is a 47 y  o female who presents for re-evaluation of hoarseness  Since her prior visit, she had an evaluation session with speech therapy  Speech therapy had recommended that she have 1-2 sessions per week for 4-5 weeks  However, she was able to follow up with this because she went on vacation  She feels her voice has improved somewhat with the use of exercises taught to her by speech therapy  She will continue to follow with speech therapy now that she has returned from vacation  She also has a history of acid reflux and takes Protonix as needed  Intermittent heartburn  Past Medical History:   Diagnosis Date    Anxiety     Colitis     Ulcerative colitis ([facility] [address] )        /70 (BP Location: Left arm, Patient Position: Sitting, Cuff Size: Standard)   Pulse 67   Ht 4' 11" (1 499 m)   Wt 58 1 kg (128 lb)   BMI 25 85 kg/m²       Physical Exam   Constitutional: Oriented to person, place, and time  Well-developed and well-nourished, no apparent distress, non-toxic appearance  Cooperative, able to hear and answer questions without difficulty  Voice: Normal voice quality  Head: Normocephalic, atraumatic  No scars, masses or lesions  Face: Symmetric, no edema, no sinus tenderness  Eyes: Vision grossly intact, extra-ocular movement intact  Ears: External ear normal   Bilateral tympanic membranes are intact with intact normal landmarks  No post-auricular erythema or tenderness  Nose: Septum midline, nares clear  Mucosa moist, turbinates well appearing  No crusting, polyps or discharge evident  Oral cavity: Dentition intact  Mucosa moist, lips normal   Tongue mobile, floor of mouth normal   Hard palate unremarkable  No masses or lesions  Oropharynx: Uvula is midline, soft palate normal   Unremarkable oropharyngeal inlet  Tonsils unremarkable  Posterior pharyngeal wall clear  No masses or lesions    Salivary glands:  Parotid glands and submandibular glands symmetric, no enlargement or tenderness  Neck: Normal laryngeal elevation with swallow  Trachea midline  No masses or lesions  No palpable adenopathy  Thyroid: normal in size, unremarkable without tenderness or palpable nodules  Pulmonary/Chest: Normal effort and rate  No respiratory distress  Musculoskeletal: Normal range of motion  Neurological: Cranial nerves 2-12 intact  Skin: Skin is warm and dry  Psychiatric: Normal mood and affect  A/P:  I still suspect that her dysphonia is due to voice overuse  She had some improvement in the interim with use of speech therapy exercises and will now be continued to follow with speech therapy  We discussed that reflux may also be playing a role and worsening or hoarseness  She has intermittent heartburn for which she takes Protonix as needed  We discussed options of using Protonix regularly for possible LPR  At this point she agrees to continue following with speech therapy and will take Protonix regularly for approximately 1 month  I recommend she follow up in 3 months for re-evaluation of this will be in to ski season at which time she will be using her voice fairly regularly and heavily

## 2020-01-03 ENCOUNTER — OCCMED (OUTPATIENT)
Dept: URGENT CARE | Facility: CLINIC | Age: 55
End: 2020-01-03
Payer: OTHER MISCELLANEOUS

## 2020-01-03 ENCOUNTER — APPOINTMENT (OUTPATIENT)
Dept: RADIOLOGY | Facility: CLINIC | Age: 55
End: 2020-01-03
Payer: OTHER MISCELLANEOUS

## 2020-01-03 DIAGNOSIS — M54.50 ACUTE LEFT-SIDED LOW BACK PAIN WITHOUT SCIATICA: ICD-10-CM

## 2020-01-03 DIAGNOSIS — M54.50 ACUTE LEFT-SIDED LOW BACK PAIN WITHOUT SCIATICA: Primary | ICD-10-CM

## 2020-01-03 PROCEDURE — 99283 EMERGENCY DEPT VISIT LOW MDM: CPT | Performed by: PHYSICIAN ASSISTANT

## 2020-01-03 PROCEDURE — G0382 LEV 3 HOSP TYPE B ED VISIT: HCPCS | Performed by: PHYSICIAN ASSISTANT

## 2020-01-03 PROCEDURE — 72100 X-RAY EXAM L-S SPINE 2/3 VWS: CPT

## 2020-01-07 ENCOUNTER — APPOINTMENT (OUTPATIENT)
Dept: URGENT CARE | Facility: CLINIC | Age: 55
End: 2020-01-07
Payer: OTHER MISCELLANEOUS

## 2020-01-07 ENCOUNTER — TELEPHONE (OUTPATIENT)
Dept: GASTROENTEROLOGY | Facility: CLINIC | Age: 55
End: 2020-01-07

## 2020-01-07 PROCEDURE — 99213 OFFICE O/P EST LOW 20 MIN: CPT | Performed by: PHYSICIAN ASSISTANT

## 2020-01-09 ENCOUNTER — DOCUMENTATION (OUTPATIENT)
Dept: SPEECH THERAPY | Facility: CLINIC | Age: 55
End: 2020-01-09

## 2020-01-09 DIAGNOSIS — R49.0 DYSPHONIA: Primary | ICD-10-CM

## 2020-01-09 NOTE — TELEPHONE ENCOUNTER
rvcd fax from [facility] 72 40 mg/0 4 ml quantity 2, duration 28 approved for 12 months from 1/9/2020 to 1/9/2021  Pharmacy:  [facility]

## 2020-01-09 NOTE — PROGRESS NOTES
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[place], "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  This also requires speaking loudly over crowds and other noises  She has a history of shoulder injury and reports favor of that shoulder and cervical spine  This strain and compensation likely also effects her overall vocal cord care  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 and , again utilizing her voice for hours on end each day, often multiple days in a row  It is suspected that Ms Abdias Villar is suffering from a variation of muscle tension dysphonia characterized by the cervical and shoulder based tension she carries, used also as compensation for amplification, abusive hours and practice over multiple years  Patient has hallmark signs of MTD including pain, tenderness, soreness, loss of voice, vocal quality changes, tension, strained vocal quality, and vocal recovery after a few days rest   Likely the patient's voice is not able to recover as quickly and readily as it has in the past which is one of the reasons she sought speech therapy  Ms Abdias Villar was seen for one session of therapy before leaving on vacation for a two week period    Prior to leaving for vacation patient was educated extensively on her vocally abusive behaviors, vocal hygiene recommendations, breathing techniques to ensure appropriate breath support, and the plan of care for future sessions  Upon her return from vacation in early November 2019, patient was contacted to schedule follow up and a message was left  The patient called to set up an appt for 12/12/19 @ 8 am and no-showed  A call was placed to the patient with no return phone call  She is appropriate to be discharged at this time      Short Term Goals at the Time of Discharge:  1  Patient will complete voice handicap index in order to determine baseline impact  2   Patient will be educated on effects on the voice including medication, GERD, diet, lifestyle and behavior  3   Patient will be provided exercises in order to decrease overall muscle tension  4   Patient will be educated on breathing exercises appropriate for relaxation of speaking muscles  5   Patient will be evaluated as a candidate for myofacial release therapy 2' reported pain, tenderness and soreness in the cervical neck/spine  6   Patient will be taught [address]  figures of retraction and anchor for appropriate speaking voice  7   Patient will discuss work modifications to assist in vocal recovery  Discharge Information:  Patient's packet provided to her during her last treatment session should be utilized on a day to day basis  Recommend she continue with speech therapy for appropriate application and further education/strategies  Participation in Treatment (at discharge):   Cooperative    Patient is discharged to [address] [place] [address] name/phone number for follow up: [phone]

## 2020-01-10 NOTE — TELEPHONE ENCOUNTER
Called [facility] spoke to April gave a verbal script for Humira 40mg/0 4mg citrate free  They will process and contact pt to schedule delivery      Pt was previously on humira 40mg/0 8ML

## 2020-01-17 ENCOUNTER — APPOINTMENT (OUTPATIENT)
Dept: URGENT CARE | Facility: CLINIC | Age: 55
End: 2020-01-17
Payer: OTHER MISCELLANEOUS

## 2020-01-17 PROCEDURE — 99213 OFFICE O/P EST LOW 20 MIN: CPT

## 2020-01-28 ENCOUNTER — APPOINTMENT (OUTPATIENT)
Dept: URGENT CARE | Facility: CLINIC | Age: 55
End: 2020-01-28
Payer: OTHER MISCELLANEOUS

## 2020-01-28 ENCOUNTER — OFFICE VISIT (OUTPATIENT)
Dept: URGENT CARE | Facility: CLINIC | Age: 55
End: 2020-01-28
Payer: OTHER MISCELLANEOUS

## 2020-01-28 VITALS
DIASTOLIC BLOOD PRESSURE: 80 MMHG | SYSTOLIC BLOOD PRESSURE: 127 MMHG | HEART RATE: 71 BPM | TEMPERATURE: 98.1 F | OXYGEN SATURATION: 99 % | RESPIRATION RATE: 18 BRPM

## 2020-01-28 DIAGNOSIS — H92.01 EAR PAIN, RIGHT: ICD-10-CM

## 2020-01-28 DIAGNOSIS — H69.81 EUSTACHIAN TUBE DYSFUNCTION, RIGHT: Primary | ICD-10-CM

## 2020-01-28 PROCEDURE — G0382 LEV 3 HOSP TYPE B ED VISIT: HCPCS | Performed by: FAMILY MEDICINE

## 2020-01-28 PROCEDURE — 99283 EMERGENCY DEPT VISIT LOW MDM: CPT | Performed by: FAMILY MEDICINE

## 2020-01-28 RX ORDER — AMOXICILLIN 875 MG/1
875 TABLET, COATED ORAL 2 TIMES DAILY
Qty: 20 TABLET | Refills: 0 | Status: SHIPPED | OUTPATIENT
Start: 2020-01-28 | End: 2020-02-07

## 2020-01-28 RX ORDER — PREDNISONE 10 MG/1
TABLET ORAL
Qty: 15 TABLET | Refills: 0 | Status: SHIPPED | OUTPATIENT
Start: 2020-01-28 | End: 2021-08-10

## 2020-01-28 NOTE — PATIENT INSTRUCTIONS
Right ear pain most likely due to eustachian tube dysfunction  No evidence of current bacterial infection on exam   Trial of nasal steroid such as Flonase OTC or generic fluticasone  For the airplane flight you may try prednisone 10 mg-1 tablet by mouth up to 3 times a day for up to 5 days as needed for congestion/pain  If there is worsening redness of the throat, worsening ear pain, fevers or chills start amoxicillin 875 mg-  Take 1 tablet by mouth twice daily for 10 days

## 2020-01-28 NOTE — PROGRESS NOTES
[facility]        NAME: Carlos Velazquez is a 47 y o  female  : 1965    MRN: [id number]  DATE: 2020  TIME: 9:00 AM    Assessment and Plan   Eustachian tube dysfunction, right [H69 81]  1  Eustachian tube dysfunction, right  predniSONE 10 mg tablet    amoxicillin (AMOXIL) 875 mg tablet   2  Ear pain, right  amoxicillin (AMOXIL) 875 mg tablet         Patient Instructions       Follow up with PCP in 3-5 days  Proceed to  ER if symptoms worsen  Chief Complaint     Chief Complaint   Patient presents with    Earache     Pt c/o right ear pain for three days  History of Present Illness       PATIENT PRESENTS WITH RIGHT EAR PAIN FOR 3 DAYS  THERE IS SOME ASSOCIATED MILD COUGH AND SORE THROAT  THE RIGHT EAR PAIN RADIATES TO THE THROAT  NO FEVERS OR CHILLS  PATIENT TAKES OVER-THE-COUNTER IBUPROFEN WITH NO OVERT IMPROVEMENT  Review of Systems   Review of Systems   Constitutional: Negative for chills, diaphoresis, fatigue and fever  HENT: Positive for ear pain (Right side) and sore throat  Negative for ear discharge, rhinorrhea and sinus pain  Respiratory: Positive for cough  Musculoskeletal: Positive for myalgias  Hematological: Negative for adenopathy           Current Medications       Current Outpatient Medications:     Adalimumab (HUMIRA PEN) 40 MG/0 8ML PNKT, , Disp: , Rfl:     adalimumab (HUMIRA) 40 mg/0 8 mL PSKT, Inject 0 8 mL (40 mg total) under the skin once for 1 dose, Disp: 0 4 mL, Rfl: 0    amoxicillin (AMOXIL) 875 mg tablet, Take 1 tablet (875 mg total) by mouth 2 (two) times a day for 10 days, Disp: 20 tablet, Rfl: 0    clobetasol (TEMOVATE) 0 05 % cream, clobetasol 0 05 % topical cream, Disp: , Rfl:     clonazePAM (KlonoPIN) 0 5 mg tablet, Take 0 5 mg by mouth daily at bedtime, Disp: , Rfl: 0    Diphenoxylate-Atropine (LOMOTIL PO), Lomotil  QHS, Disp: , Rfl:     ibuprofen (MOTRIN) 600 mg tablet, take 1 tablet by mouth every 6 hours for THE FIRST 50 HOURS then if needed for pain, Disp: , Rfl: 0    pantoprazole (PROTONIX) 40 mg tablet, Take 1 tablet (40 mg total) by mouth daily, Disp: 90 tablet, Rfl: 3    predniSONE 10 mg tablet, 1 tab by mouth 3 times a day for 5 days, Disp: 15 tablet, Rfl: 0    venlafaxine (EFFEXOR) 75 mg tablet, Take 75 mg by mouth daily, Disp: , Rfl: 0    Current Allergies     Allergies as of 01/28/2020 - Reviewed 01/28/2020   Allergen Reaction Noted    Thimerosal Hives 03/22/2018            The following portions of the patient's history were reviewed and updated as appropriate: allergies, current medications, past family history, past medical history, past social history, past surgical history and problem list      Past Medical History:   Diagnosis Date    Anxiety     Colitis     Ulcerative colitis ([facility] [address] )        Past Surgical History:   Procedure Laterality Date    COLONOSCOPY      TOOTH EXTRACTION         Family History   Problem Relation Age of Onset    No Known Problems Mother     Stroke Father     Hypertension Father          Medications have been verified  Objective   /80   Pulse 71   Temp 98 1 °F (36 7 °C)   Resp 18   SpO2 99%        Physical Exam     Physical Exam   Constitutional: She appears well-developed and well-nourished  No distress  HENT:   Left Ear: External ear normal    Mouth/Throat: No oropharyngeal exudate  Left tympanic membrane and ear canal normal, right tympanic membrane is somewhat bulging but this clear with no redness  No drainage  Canal is normal   Posterior pharynx with mild erythema but no tonsillar enlargement or pus  Eyes: Pupils are equal, round, and reactive to light  Conjunctivae and EOM are normal  Right eye exhibits no discharge  Left eye exhibits no discharge  Neck: No tracheal deviation present  No thyromegaly present  Cardiovascular: Normal rate, regular rhythm and normal heart sounds  Exam reveals no friction rub  No murmur heard    Pulmonary/Chest: Effort normal and breath sounds normal  No stridor  No respiratory distress  She has no wheezes  She has no rales  Lymphadenopathy:     She has no cervical adenopathy  Skin: She is not diaphoretic

## 2020-02-24 ENCOUNTER — APPOINTMENT (OUTPATIENT)
Dept: URGENT CARE | Facility: CLINIC | Age: 55
End: 2020-02-24
Payer: OTHER MISCELLANEOUS

## 2020-02-24 PROCEDURE — 99213 OFFICE O/P EST LOW 20 MIN: CPT | Performed by: FAMILY MEDICINE

## 2020-03-31 ENCOUNTER — TELEPHONE (OUTPATIENT)
Dept: GASTROENTEROLOGY | Facility: CLINIC | Age: 55
End: 2020-03-31

## 2020-03-31 RX ORDER — CITALOPRAM 10 MG/1
5 TABLET ORAL DAILY
COMMUNITY
Start: 2020-02-10

## 2020-03-31 NOTE — TELEPHONE ENCOUNTER
Can you put it on the schedule for a virtual and then put telephone next to [facility]

## 2020-04-01 ENCOUNTER — TELEMEDICINE (OUTPATIENT)
Dept: GASTROENTEROLOGY | Facility: CLINIC | Age: 55
End: 2020-04-01
Payer: COMMERCIAL

## 2020-04-01 DIAGNOSIS — K21.9 GASTROESOPHAGEAL REFLUX DISEASE, ESOPHAGITIS PRESENCE NOT SPECIFIED: Primary | ICD-10-CM

## 2020-04-01 DIAGNOSIS — K51.919 ULCERATIVE COLITIS WITH COMPLICATION, UNSPECIFIED LOCATION (HCC): ICD-10-CM

## 2020-04-01 PROCEDURE — 99213 OFFICE O/P EST LOW 20 MIN: CPT | Performed by: PHYSICIAN ASSISTANT

## 2020-04-01 RX ORDER — PANTOPRAZOLE SODIUM 40 MG/1
40 TABLET, DELAYED RELEASE ORAL DAILY
Qty: 90 TABLET | Refills: 3 | Status: SHIPPED | OUTPATIENT
Start: 2020-04-01 | End: 2021-07-16 | Stop reason: SDUPTHER

## 2020-06-26 ENCOUNTER — TELEPHONE (OUTPATIENT)
Dept: GASTROENTEROLOGY | Facility: CLINIC | Age: 55
End: 2020-06-26

## 2020-08-19 ENCOUNTER — TELEPHONE (OUTPATIENT)
Dept: GASTROENTEROLOGY | Facility: CLINIC | Age: 55
End: 2020-08-19

## 2020-08-19 NOTE — TELEPHONE ENCOUNTER
Deneen pt-  [facility]  Caitlin Steele Patient has been experiencing hives in the last month     She has D/C'D her Humira because of the hives    Please phone patient @ [phone]  [facility] -[phone]

## 2020-09-10 ENCOUNTER — OFFICE VISIT (OUTPATIENT)
Dept: GASTROENTEROLOGY | Facility: CLINIC | Age: 55
End: 2020-09-10
Payer: COMMERCIAL

## 2020-09-10 VITALS
WEIGHT: 127.6 LBS | DIASTOLIC BLOOD PRESSURE: 70 MMHG | HEART RATE: 81 BPM | HEIGHT: 59 IN | TEMPERATURE: 97.2 F | SYSTOLIC BLOOD PRESSURE: 124 MMHG | BODY MASS INDEX: 25.72 KG/M2

## 2020-09-10 DIAGNOSIS — L50.9 HIVES: ICD-10-CM

## 2020-09-10 DIAGNOSIS — K51.919 ULCERATIVE COLITIS WITH COMPLICATION, UNSPECIFIED LOCATION (HCC): Primary | ICD-10-CM

## 2020-09-10 PROCEDURE — 99213 OFFICE O/P EST LOW 20 MIN: CPT | Performed by: PHYSICIAN ASSISTANT

## 2020-09-10 RX ORDER — DIPHENOXYLATE HCL/ATROPINE 2.5-.025/5
LIQUID (ML) ORAL
COMMUNITY
End: 2021-08-10

## 2020-09-10 RX ORDER — DICYCLOMINE HCL 20 MG
20 TABLET ORAL EVERY 6 HOURS
Qty: 60 TABLET | Refills: 6 | Status: SHIPPED | OUTPATIENT
Start: 2020-09-10

## 2020-09-10 RX ORDER — DIPHENOXYLATE HYDROCHLORIDE AND ATROPINE SULFATE 2.5; .025 MG/1; MG/1
2 TABLET ORAL 4 TIMES DAILY PRN
Qty: 60 TABLET | Refills: 3 | Status: SHIPPED | OUTPATIENT
Start: 2020-09-10 | End: 2021-10-11 | Stop reason: SDUPTHER

## 2020-09-10 NOTE — PATIENT INSTRUCTIONS
Abdominal Pain   WHAT YOU NEED TO KNOW:   Abdominal pain can be dull, achy, or sharp  You may have pain in one area of your abdomen, or in your entire abdomen  Your pain may be caused by a condition such as constipation, food sensitivity or poisoning, infection, or a blockage  Abdominal pain can also be from a hernia, appendicitis, or an ulcer  Liver, gallbladder, or kidney conditions can also cause abdominal pain  The cause of your abdominal pain may be unknown  DISCHARGE INSTRUCTIONS:   Return to the emergency department if:   · You have new chest pain or shortness of breath  · You have pulsing pain in your upper abdomen or lower back that suddenly becomes constant  · Your pain is in the right lower abdominal area and worsens with movement  · You have a fever over 100 4°F (38°C) or shaking chills  · You are vomiting and cannot keep food or liquids down  · Your pain does not improve or gets worse over the next 8 to 12 hours  · You see blood in your vomit or bowel movements, or they look black and tarry  · Your skin or the whites of your eyes turn yellow  · You are a woman and have a large amount of vaginal bleeding that is not your monthly period  Contact your healthcare provider if:   · You have pain in your lower back  · You are a man and have pain in your testicles  · You have pain when you urinate  · You have questions or concerns about your condition or care  Follow up with your healthcare provider within 24 hours or as directed:  Write down your questions so you remember to ask them during your visits  Medicines:   · Medicines  may be given to calm your stomach and prevent vomiting or to decrease pain  Ask how to take pain medicine safely  · Take your medicine as directed  Contact your healthcare provider if you think your medicine is not helping or if you have side effects  Tell him of her if you are allergic to any medicine   Keep a list of the medicines, vitamins, and herbs you take  Include the amounts, and when and why you take them  Bring the list or the pill bottles to follow-up visits  Carry your medicine list with you in case of an emergency  © 2017 [facility] Information is for End User's use only and may not be sold, redistributed or otherwise used for commercial purposes  All illustrations and images included in CareNotes® are the copyrighted property of A D A M , Inc  or Mitchel Leonard  The above information is an  only  It is not intended as medical advice for individual conditions or treatments  Talk to your doctor, nurse or pharmacist before following any medical regimen to see if it is safe and effective for you

## 2020-09-10 NOTE — LETTER
September 10, 2020     Rosa Angulo PA-C  [address] [facility]  [address] [facility]  [address] [address]    Patient: Sonia Officer   YOB: 1965   Date of Visit: 9/10/2020       Dear Dr Eduardo Duque: Thank you for referring Sonia Officer to me for evaluation  Below are my notes for this consultation  If you have questions, please do not hesitate to call me  I look forward to following your patient along with you  Sincerely,        Meredith Roth PA-C        CC: No Recipients  Fay Nugent  9/10/2020 12:54 PM  Sign when Signing Visit  [facility] - Outpatient Follow-up Note  Sonia Officer 47 y o  female MRN: [id number]  Encounter: [id number]          ASSESSMENT AND PLAN:      1  Ulcerative colitis with complication, unspecified location [facility])  Will update laboratories to include a CBC, CMP, ESR  Will continue Humira every other week  Patient is due for repeat colonoscopy next year  Patient is up-to-date with all immunizations  2  Hives  Will refer to allergist   ______________________________________________________________________    SUBJECTIVE:    63-year-old female who is here for follow-up of ulcerative colitis  Patient reports that she is actually doing very well related to her colitis  She reports that she is not having any significant diarrhea at the present time  She is reporting some mild right lower quadrant abdominal pain  She denies any nausea or vomiting  Her biggest issue right now is that she is suffering from hives  She was questioning whether this was related to any of her medications  She did start some new psychiatric medication earlier this year  She reports that she is following up with her psychiatrist about this  She denies any melena or rectal bleeding  Patient is due for colonoscopy next year  Patient has not had any recent blood work done so this will be updated today    Patient does report a significant amount of stress in her life  REVIEW OF SYSTEMS IS OTHERWISE NEGATIVE  Historical Information   Past Medical History:   Diagnosis Date    Anxiety     Colitis     Ulcerative colitis ([address] )      Past Surgical History:   Procedure Laterality Date    COLONOSCOPY      TOOTH EXTRACTION       Social History   Social History     Substance and Sexual Activity   Alcohol Use Yes    Comment: occ     Social History     Substance and Sexual Activity   Drug Use No     Social History     Tobacco Use   Smoking Status Never Smoker   Smokeless Tobacco Never Used     Family History   Problem Relation Age of Onset    No Known Problems Mother     Stroke Father     Hypertension Father        Meds/Allergies       Current Outpatient Medications:     Adalimumab (HUMIRA PEN) 40 MG/0 8ML PNKT    citalopram (CeleXA) 10 mg tablet    clonazePAM (KlonoPIN) 0 5 mg tablet    diphenoxylate-atropine (LOMOTIL) 2 5-0 025 mg/5 mL liquid    ibuprofen (MOTRIN) 600 mg tablet    pantoprazole (PROTONIX) 40 mg tablet    clobetasol (TEMOVATE) 0 05 % cream    dicyclomine (BENTYL) 20 mg tablet    diphenoxylate-atropine (Lomotil) 2 5-0 025 mg per tablet    predniSONE 10 mg tablet    venlafaxine (EFFEXOR) 75 mg tablet    Allergies   Allergen Reactions    Thimerosal Hives           Objective     Blood pressure 124/70, pulse 81, temperature (!) 97 2 °F (36 2 °C), temperature source Temporal, height 4' 11" (1 499 m), weight 57 9 kg (127 lb 9 6 oz)  Body mass index is 25 77 kg/m²  PHYSICAL EXAM:      General Appearance:   Alert, cooperative, no distress   HEENT:   Normocephalic, atraumatic, anicteric      Neck:  Supple, symmetrical, trachea midline   Lungs:   Clear to auscultation bilaterally; no rales, rhonchi or wheezing; respirations unlabored    Heart[de-identified]   Regular rate and rhythm; no murmur, rub, or gallop     Abdomen:   Soft, non-tender, non-distended; normal bowel sounds; no masses, no organomegaly    Genitalia:   Deferred    Rectal:   Deferred    Extremities:  No cyanosis, clubbing or edema    Pulses:  2+ and symmetric    Skin:  No jaundice, rashes, or lesions    Lymph nodes:  No palpable cervical lymphadenopathy        Lab Results:   No visits with results within 1 Day(s) from this visit  Latest known visit with results is:   Admission on 03/22/2018, Discharged on 03/23/2018   Component Date Value    Color, UA 03/22/2018 Red     Clarity, UA 03/22/2018 Cloudy     Specific Gravity, UA 03/22/2018 1 025     pH, UA 03/22/2018 6 5     Leukocytes, UA 03/22/2018 Large*    Nitrite, UA 03/22/2018 Negative     Protein, UA 03/22/2018 30 (1+)*    Glucose, UA 03/22/2018 Negative     Ketones, UA 03/22/2018 Negative     Urobilinogen, UA 03/22/2018 0 2     Bilirubin, UA 03/22/2018 Negative     Blood, UA 03/22/2018 Large*    RBC, UA 03/22/2018 Innumerable*    WBC, UA 03/22/2018 10-20*    Epithelial Cells 03/22/2018 Occasional     Bacteria, UA 03/22/2018 Moderate*    Urine Culture 03/22/2018 80,000-89,000 cfu/ml Escherichia coli*         Radiology Results:   No results found

## 2020-09-10 NOTE — PROGRESS NOTES
[facility] - Outpatient Follow-up Note  Mona Severin 47 y o  female MRN: [id number]  Encounter: [id number]          ASSESSMENT AND PLAN:      1  Ulcerative colitis with complication, unspecified location [facility])  Will update laboratories to include a CBC, CMP, ESR  Will continue Humira every other week  Patient is due for repeat colonoscopy next year  Patient is up-to-date with all immunizations  2  Hives  Will refer to allergist   ______________________________________________________________________    SUBJECTIVE:    63-year-old female who is here for follow-up of ulcerative colitis  Patient reports that she is actually doing very well related to her colitis  She reports that she is not having any significant diarrhea at the present time  She is reporting some mild right lower quadrant abdominal pain  She denies any nausea or vomiting  Her biggest issue right now is that she is suffering from hives  She was questioning whether this was related to any of her medications  She did start some new psychiatric medication earlier this year  She reports that she is following up with her psychiatrist about this  She denies any melena or rectal bleeding  Patient is due for colonoscopy next year  Patient has not had any recent blood work done so this will be updated today  Patient does report a significant amount of stress in her life  REVIEW OF SYSTEMS IS OTHERWISE NEGATIVE        Historical Information   Past Medical History:   Diagnosis Date    Anxiety     Colitis     Ulcerative colitis ([address] )      Past Surgical History:   Procedure Laterality Date    COLONOSCOPY      TOOTH EXTRACTION       Social History   Social History     Substance and Sexual Activity   Alcohol Use Yes    Comment: occ     Social History     Substance and Sexual Activity   Drug Use No     Social History     Tobacco Use   Smoking Status Never Smoker   Smokeless Tobacco Never Used     Family History   Problem Relation Age of Onset    No Known Problems Mother     Stroke Father     Hypertension Father        Meds/Allergies       Current Outpatient Medications:     Adalimumab (HUMIRA PEN) 40 MG/0 8ML PNKT    citalopram (CeleXA) 10 mg tablet    clonazePAM (KlonoPIN) 0 5 mg tablet    diphenoxylate-atropine (LOMOTIL) 2 5-0 025 mg/5 mL liquid    ibuprofen (MOTRIN) 600 mg tablet    pantoprazole (PROTONIX) 40 mg tablet    clobetasol (TEMOVATE) 0 05 % cream    dicyclomine (BENTYL) 20 mg tablet    diphenoxylate-atropine (Lomotil) 2 5-0 025 mg per tablet    predniSONE 10 mg tablet    venlafaxine (EFFEXOR) 75 mg tablet    Allergies   Allergen Reactions    Thimerosal Hives           Objective     Blood pressure 124/70, pulse 81, temperature (!) 97 2 °F (36 2 °C), temperature source Temporal, height 4' 11" (1 499 m), weight 57 9 kg (127 lb 9 6 oz)  Body mass index is 25 77 kg/m²  PHYSICAL EXAM:      General Appearance:   Alert, cooperative, no distress   HEENT:   Normocephalic, atraumatic, anicteric      Neck:  Supple, symmetrical, trachea midline   Lungs:   Clear to auscultation bilaterally; no rales, rhonchi or wheezing; respirations unlabored    Heart[de-identified]   Regular rate and rhythm; no murmur, rub, or gallop  Abdomen:   Soft, non-tender, non-distended; normal bowel sounds; no masses, no organomegaly    Genitalia:   Deferred    Rectal:   Deferred    Extremities:  No cyanosis, clubbing or edema    Pulses:  2+ and symmetric    Skin:  No jaundice, rashes, or lesions    Lymph nodes:  No palpable cervical lymphadenopathy        Lab Results:   No visits with results within 1 Day(s) from this visit     Latest known visit with results is:   Admission on 03/22/2018, Discharged on 03/23/2018   Component Date Value    Color,  03/22/2018 Red     Clarity,  03/22/2018 Cloudy     Specific Gravity,  03/22/2018 1 025     pH, UA 03/22/2018 6 5     Leukocytes,  03/22/2018 Large*    Nitrite, UA 03/22/2018 Negative     Protein, UA 03/22/2018 30 (1+)*    Glucose, UA 03/22/2018 Negative     Ketones, UA 03/22/2018 Negative     Urobilinogen, UA 03/22/2018 0 2     Bilirubin, UA 03/22/2018 Negative     Blood, UA 03/22/2018 Large*    RBC, UA 03/22/2018 Innumerable*    WBC, UA 03/22/2018 10-20*    Epithelial Cells 03/22/2018 Occasional     Bacteria, UA 03/22/2018 Moderate*    Urine Culture 03/22/2018 80,000-89,000 cfu/ml Escherichia coli*         Radiology Results:   No results found

## 2020-09-18 ENCOUNTER — TELEPHONE (OUTPATIENT)
Dept: GASTROENTEROLOGY | Facility: CLINIC | Age: 55
End: 2020-09-18

## 2020-10-08 ENCOUNTER — TELEPHONE (OUTPATIENT)
Dept: GASTROENTEROLOGY | Facility: CLINIC | Age: 55
End: 2020-10-08

## 2021-01-05 ENCOUNTER — TELEPHONE (OUTPATIENT)
Dept: GASTROENTEROLOGY | Facility: CLINIC | Age: 56
End: 2021-01-05

## 2021-01-05 DIAGNOSIS — K51.919 ULCERATIVE COLITIS WITH COMPLICATION, UNSPECIFIED LOCATION (HCC): Primary | ICD-10-CM

## 2021-01-07 NOTE — TELEPHONE ENCOUNTER
Humira - Approved for Every 2 weeks dosing   Valid range: 1/6/21-1/6/22  Fills with [facility] T: [phone] Fax# [phone]    Spoke to Ashkan Tay (Rep) from [place] [address] advised PA was renewed  Faxed copy of renewal letter and sent prescription by [facility]      Routing to pa for approval  Ty

## 2021-01-11 RX ORDER — ADALIMUMAB 40MG/0.4ML
KIT SUBCUTANEOUS
Qty: 2 EACH | Refills: 24 | Status: SHIPPED | OUTPATIENT
Start: 2021-01-11 | End: 2021-12-16

## 2021-01-25 ENCOUNTER — TELEPHONE (OUTPATIENT)
Dept: GASTROENTEROLOGY | Facility: CLINIC | Age: 56
End: 2021-01-25

## 2021-01-25 NOTE — TELEPHONE ENCOUNTER
Pt is in [facility] and wants to speak to James  Wants to go over her bw and wants another referral sent for an allergist since she lost the original referral   Pt says she has what may be hives on each leg going up her knees    Wants to talk to [employer] to pa    Mailing referral to allergist to pt home

## 2021-03-10 DIAGNOSIS — Z23 ENCOUNTER FOR IMMUNIZATION: ICD-10-CM

## 2021-06-28 ENCOUNTER — TELEPHONE (OUTPATIENT)
Dept: GASTROENTEROLOGY | Facility: CLINIC | Age: 56
End: 2021-06-28

## 2021-06-28 NOTE — TELEPHONE ENCOUNTER
Ping patient -  [facility] called to get Allergy & Current Medication List ASAP for this patient  You can FAX to [phone]  OR if you need to call [phone] with any questions    x

## 2021-07-16 DIAGNOSIS — K21.9 GASTROESOPHAGEAL REFLUX DISEASE: ICD-10-CM

## 2021-07-16 RX ORDER — PANTOPRAZOLE SODIUM 40 MG/1
40 TABLET, DELAYED RELEASE ORAL DAILY
Qty: 90 TABLET | Refills: 3 | Status: SHIPPED | OUTPATIENT
Start: 2021-07-16 | End: 2022-07-26

## 2021-07-16 NOTE — TELEPHONE ENCOUNTER
Ping patient - patient called and needs a new script sent to [facility], [employer] @ [phone]  pantoprazole (PROTONIX) 40 mg tablet [[id number]]    If there is any questions, please RTRN call to patient - [phone]    Thx

## 2021-08-03 ENCOUNTER — OFFICE VISIT (OUTPATIENT)
Dept: GASTROENTEROLOGY | Facility: CLINIC | Age: 56
End: 2021-08-03
Payer: COMMERCIAL

## 2021-08-03 VITALS
BODY MASS INDEX: 25.96 KG/M2 | DIASTOLIC BLOOD PRESSURE: 80 MMHG | HEART RATE: 64 BPM | WEIGHT: 128.8 LBS | HEIGHT: 59 IN | SYSTOLIC BLOOD PRESSURE: 122 MMHG

## 2021-08-03 DIAGNOSIS — K51.919 ULCERATIVE COLITIS WITH COMPLICATION, UNSPECIFIED LOCATION (HCC): Primary | ICD-10-CM

## 2021-08-03 PROCEDURE — 99213 OFFICE O/P EST LOW 20 MIN: CPT | Performed by: PHYSICIAN ASSISTANT

## 2021-08-03 NOTE — PATIENT INSTRUCTIONS
Colitis   WHAT YOU NEED TO KNOW:   Colitis is swelling and irritation of your colon  Colitis may be caused by ulcers or a problem with your immune system  Bacteria, a virus, or a parasite may also cause colitis  The cause may not be known  You may have diarrhea, abdominal pain, fever, or blood or mucus in your bowel movement  DISCHARGE INSTRUCTIONS:   Return to the emergency department if:   · You have sudden trouble breathing  · Your bowel movements are black or have blood in them  · You have blood in your vomit  · You have severe abdominal pain or your abdomen is swollen and feels hard  · You have any of the following signs of dehydration:     ? Dizziness or weakness    ? Dry mouth, cracked lips, or severe thirst    ? Fast heartbeat or breathing    ? Urinating very little or not at all    Call your doctor if:   · Your symptoms get worse or do not go away  · You have a fever, chills, cough, or feel weak and achy  · You suddenly lose weight without trying  · You have questions or concerns about your condition or care  Medicines:   · Medicines  may be given to decrease inflammation in your colon and treat diarrhea  · Take your medicine as directed  Contact your healthcare provider if you think your medicine is not helping or if you have side effects  Tell him of her if you are allergic to any medicine  Keep a list of the medicines, vitamins, and herbs you take  Include the amounts, and when and why you take them  Bring the list or the pill bottles to follow-up visits  Carry your medicine list with you in case of an emergency  Manage your symptoms:   · Drink liquids as directed to help prevent dehydration  Good liquids to drink include water, juice, and broth  Ask how much liquid to drink each day  You may need to drink an oral rehydration solution (ORS)  An ORS contains a balance of water, salt, and sugar to replace body fluids lost during diarrhea      · Eat a variety of healthy foods   Healthy foods include fruits, vegetables, whole-grain breads, beans, low-fat dairy products, lean meats, and fish  You may need to eat several small meals throughout the day instead of large meals  Avoid spicy foods, caffeine, chocolate, and foods high in fat  · Talk to your healthcare provider before you take NSAIDs  NSAIDs can cause worsen your symptoms if ulcers are causing your colitis  · Start to exercise when you feel better  Regular exercise helps your bowels work normally  Ask about the best exercise plan for you  Prevent the spread of germs:       · Wash your hands often  Wash your hands several times each day  Wash after you use the bathroom, change a child's diaper, and before you prepare or eat food  Use soap and water every time  Rub your soapy hands together, lacing your fingers  Wash the front and back of your hands, and in between your fingers  Use the fingers of one hand to scrub under the fingernails of the other hand  Wash for at least 20 seconds  Rinse with warm, running water for several seconds  Then dry your hands with a clean towel or paper towel  Use hand  that contains alcohol if soap and water are not available  Do not touch your eyes, nose, or mouth without washing your hands first          · Cover a sneeze or cough  Use a tissue that covers your mouth and nose  Throw the tissue away in a trash can right away  Use the bend of your arm if a tissue is not available  Wash your hands well with soap and water or use a hand   · Clean surfaces often  Clean doorknobs, countertops, cell phones, and other surfaces that are touched often  Use a disinfecting wipe, a single-use sponge, or a cloth you can wash and reuse  Use disinfecting  if you do not have wipes  You can create a disinfecting  by mixing 1 part bleach with 10 parts water  · Ask about vaccines you may need  Vaccines help prevent disease caused by some viruses and bacteria  Get the influenza (flu) vaccine as soon as recommended each year  The flu vaccine is usually available starting in September or October  Flu viruses change, so it is important to get a flu vaccine every year  Get the pneumonia vaccine if recommended  This vaccine is usually recommended every 5 years  Your provider will tell you when to get this vaccine, if needed  Your healthcare provider can tell you if you should get other vaccines, and when to get them  Follow up with your doctor as directed: You may need to return for a colonoscopy or other tests  Write down how often you have a bowel movements and what they look like  Bring this to your follow-up visits  Write down your questions so you remember to ask them during your visits  © Copyright [employer] 2021 Information is for End User's use only and may not be sold, redistributed or otherwise used for commercial purposes  All illustrations and images included in CareNotes® are the copyrighted property of A D A M , Inc  or Alvaro Gonsalez  The above information is an  only  It is not intended as medical advice for individual conditions or treatments  Talk to your doctor, nurse or pharmacist before following any medical regimen to see if it is safe and effective for you

## 2021-08-03 NOTE — LETTER
August 3, 2021     Keisha Chang, [address] [facility]  [address] [place] [address]    Patient: Ronak Longo   YOB: 1965   Date of Visit: 8/3/2021       Dear Dr Fili Brothers: Thank you for referring Ronak Longo to me for evaluation  Below are my notes for this consultation  If you have questions, please do not hesitate to call me  I look forward to following your patient along with you  Sincerely,        Ruthy Dorman PA-C        CC: No Recipients  Fay Miller  8/3/2021 10:11 AM  Sign when Signing Visit  [facility] - Outpatient Follow-up Note  Ronak Longo 54 y o  female MRN: [id number]  Encounter: [id number]          ASSESSMENT AND PLAN:      1  Ulcerative colitis with complication, unspecified location [facility])  -Will plan colonoscopy with biopsy for surveillance purposes     -Will update laboratories     -Will continue Humira every other week  -Patient will follow-up in 6 months   ______________________________________________________________________    SUBJECTIVE:   80-year-old female who presents to the office today for follow-up of ulcerative colitis  Patient is in remission on Humira every other week  Patient reports she is doing quite well  Patient is still reporting several bowel movements today but this is her baseline  Patient denies any rectal bleeding  Patient denies any abdominal pain, nausea, vomiting  Patient is due for repeat colonoscopy for surveillance purposes this year  Patient is due for updated laboratories  Patient reports her appetite is good  She reports that her weight has been stable  REVIEW OF SYSTEMS IS OTHERWISE NEGATIVE        Historical Information   Past Medical History:   Diagnosis Date    Anxiety     Colitis     Ulcerative colitis ([address] )      Past Surgical History:   Procedure Laterality Date    COLONOSCOPY      TOOTH EXTRACTION       Social History   Social History     Substance and Sexual Activity   Alcohol Use Yes    Comment: occ     Social History     Substance and Sexual Activity   Drug Use No     Social History     Tobacco Use   Smoking Status Never Smoker   Smokeless Tobacco Never Used     Family History   Problem Relation Age of Onset    No Known Problems Mother     Stroke Father     Hypertension Father        Meds/Allergies       Current Outpatient Medications:     Adalimumab (Humira Pen) 40 MG/0 4ML PNKT    Adalimumab (HUMIRA PEN) 40 MG/0 8ML PNKT    citalopram (CeleXA) 10 mg tablet    clobetasol (TEMOVATE) 0 05 % cream    clonazePAM (KlonoPIN) 0 5 mg tablet    dicyclomine (BENTYL) 20 mg tablet    diphenoxylate-atropine (Lomotil) 2 5-0 025 mg per tablet    diphenoxylate-atropine (LOMOTIL) 2 5-0 025 mg/5 mL liquid    ibuprofen (MOTRIN) 600 mg tablet    pantoprazole (PROTONIX) 40 mg tablet    predniSONE 10 mg tablet    venlafaxine (EFFEXOR) 75 mg tablet    Allergies   Allergen Reactions    Thimerosal Hives           Objective     Blood pressure 122/80, pulse 64, height 4' 11" (1 499 m), weight 58 4 kg (128 lb 12 8 oz)  Body mass index is 26 01 kg/m²  PHYSICAL EXAM:      General Appearance:   Alert, cooperative, no distress   HEENT:   Normocephalic, atraumatic, anicteric      Neck:  Supple, symmetrical, trachea midline   Lungs:   Clear to auscultation bilaterally; no rales, rhonchi or wheezing; respirations unlabored    Heart[de-identified]   Regular rate and rhythm; no murmur, rub, or gallop  Abdomen:   Soft, non-tender, non-distended; normal bowel sounds; no masses, no organomegaly    Genitalia:   Deferred    Rectal:   Deferred    Extremities:  No cyanosis, clubbing or edema    Pulses:  2+ and symmetric    Skin:  No jaundice, rashes, or lesions    Lymph nodes:  No palpable cervical lymphadenopathy        Lab Results:   No visits with results within 1 Day(s) from this visit     Latest known visit with results is:   Admission on 03/22/2018, Discharged on 03/23/2018   Component Date Value    Color, UA 03/22/2018 Red     Clarity, UA 03/22/2018 Cloudy     Specific Gravity, UA 03/22/2018 1 025     pH, UA 03/22/2018 6 5     Leukocytes, UA 03/22/2018 Large*    Nitrite, UA 03/22/2018 Negative     Protein, UA 03/22/2018 30 (1+)*    Glucose, UA 03/22/2018 Negative     Ketones, UA 03/22/2018 Negative     Urobilinogen, UA 03/22/2018 0 2     Bilirubin, UA 03/22/2018 Negative     Blood, UA 03/22/2018 Large*    RBC, UA 03/22/2018 Innumerable*    WBC, UA 03/22/2018 10-20*    Epithelial Cells 03/22/2018 Occasional     Bacteria, UA 03/22/2018 Moderate*    Urine Culture 03/22/2018 80,000-89,000 cfu/ml Escherichia coli*         Radiology Results:   No results found

## 2021-08-03 NOTE — PROGRESS NOTES
[facility] - Outpatient Follow-up Note  Justin Levine 54 y o  female MRN: [id number]  Encounter: [id number]          ASSESSMENT AND PLAN:      1  Ulcerative colitis with complication, unspecified location [facility])  -Will plan colonoscopy with biopsy for surveillance purposes     -Will update laboratories     -Will continue Humira every other week  -Patient will follow-up in 6 months   ______________________________________________________________________    SUBJECTIVE:   54-year-old female who presents to the office today for follow-up of ulcerative colitis  Patient is in remission on Humira every other week  Patient reports she is doing quite well  Patient is still reporting several bowel movements today but this is her baseline  Patient denies any rectal bleeding  Patient denies any abdominal pain, nausea, vomiting  Patient is due for repeat colonoscopy for surveillance purposes this year  Patient is due for updated laboratories  Patient reports her appetite is good  She reports that her weight has been stable  REVIEW OF SYSTEMS IS OTHERWISE NEGATIVE        Historical Information   Past Medical History:   Diagnosis Date    Anxiety     Colitis     [address]cerative [address] [address] )      Past Surgical History:   Procedure Laterality Date    COLONOSCOPY      TOOTH EXTRACTION       Social History   Social History     Substance and Sexual Activity   Alcohol Use Yes    Comment: occ     Social History     Substance and Sexual Activity   Drug Use No     Social History     Tobacco Use   Smoking Status Never Smoker   Smokeless Tobacco Never Used     Family History   Problem Relation Age of Onset    No Known Problems Mother     Stroke Father     Hypertension Father        Meds/Allergies       Current Outpatient Medications:     Adalimumab (Humira Pen) 40 MG/0 4ML PNKT    Adalimumab (HUMIRA PEN) 40 MG/0 8ML PNKT    citalopram (CeleXA) 10 mg tablet    clobetasol (TEMOVATE) 0 05 % cream    clonazePAM (KlonoPIN) 0 5 mg tablet    dicyclomine (BENTYL) 20 mg tablet    diphenoxylate-atropine (Lomotil) 2 5-0 025 mg per tablet    diphenoxylate-atropine (LOMOTIL) 2 5-0 025 mg/5 mL liquid    ibuprofen (MOTRIN) 600 mg tablet    pantoprazole (PROTONIX) 40 mg tablet    predniSONE 10 mg tablet    venlafaxine (EFFEXOR) 75 mg tablet    Allergies   Allergen Reactions    Thimerosal Hives           Objective     Blood pressure 122/80, pulse 64, height 4' 11" (1 499 m), weight 58 4 kg (128 lb 12 8 oz)  Body mass index is 26 01 kg/m²  PHYSICAL EXAM:      General Appearance:   Alert, cooperative, no distress   HEENT:   Normocephalic, atraumatic, anicteric      Neck:  Supple, symmetrical, trachea midline   Lungs:   Clear to auscultation bilaterally; no rales, rhonchi or wheezing; respirations unlabored    Heart[de-identified]   Regular rate and rhythm; no murmur, rub, or gallop  Abdomen:   Soft, non-tender, non-distended; normal bowel sounds; no masses, no organomegaly    Genitalia:   Deferred    Rectal:   Deferred    Extremities:  No cyanosis, clubbing or edema    Pulses:  2+ and symmetric    Skin:  No jaundice, rashes, or lesions    Lymph nodes:  No palpable cervical lymphadenopathy        Lab Results:   No visits with results within 1 Day(s) from this visit     Latest known visit with results is:   Admission on 03/22/2018, Discharged on 03/23/2018   Component Date Value    Color, UA 03/22/2018 Red     Clarity, UA 03/22/2018 Cloudy     Specific Gravity, UA 03/22/2018 1 025     pH, UA 03/22/2018 6 5     Leukocytes, UA 03/22/2018 Large*    Nitrite, UA 03/22/2018 Negative     Protein, UA 03/22/2018 30 (1+)*    Glucose, UA 03/22/2018 Negative     Ketones, UA 03/22/2018 Negative     Urobilinogen, UA 03/22/2018 0 2     Bilirubin, UA 03/22/2018 Negative     Blood, UA 03/22/2018 Large*    RBC, UA 03/22/2018 Innumerable*    WBC, UA 03/22/2018 10-20*    Epithelial Cells 03/22/2018 Occasional     Bacteria, UA 03/22/2018 Moderate*    Urine Culture 03/22/2018 80,000-89,000 cfu/ml Escherichia coli*         Radiology Results:   No results found

## 2021-08-10 ENCOUNTER — OFFICE VISIT (OUTPATIENT)
Dept: OTOLARYNGOLOGY | Facility: CLINIC | Age: 56
End: 2021-08-10
Payer: COMMERCIAL

## 2021-08-10 VITALS
WEIGHT: 129 LBS | HEART RATE: 75 BPM | DIASTOLIC BLOOD PRESSURE: 70 MMHG | SYSTOLIC BLOOD PRESSURE: 126 MMHG | HEIGHT: 59 IN | TEMPERATURE: 97.6 F | BODY MASS INDEX: 26 KG/M2 | OXYGEN SATURATION: 99 %

## 2021-08-10 DIAGNOSIS — R49.0 DYSPHONIA: ICD-10-CM

## 2021-08-10 DIAGNOSIS — J35.8 TONSIL STONE: ICD-10-CM

## 2021-08-10 DIAGNOSIS — R49.8 VOICE STRAIN: ICD-10-CM

## 2021-08-10 DIAGNOSIS — J38.1 VOCAL CORD POLYP: Primary | ICD-10-CM

## 2021-08-10 PROCEDURE — 99214 OFFICE O/P EST MOD 30 MIN: CPT | Performed by: OTOLARYNGOLOGY

## 2021-08-10 PROCEDURE — 31575 DIAGNOSTIC LARYNGOSCOPY: CPT | Performed by: OTOLARYNGOLOGY

## 2021-08-10 NOTE — PROGRESS NOTES
Mari Tan is a 54 y  o female who presents for re-evaluation of hoarseness  She was last seen nearly a year ago for similar concerns  At that time was noted to have significant vocal strain likely due to vocal demand for work  She was seen by speech therapy and noted significant improvement in voice since then  However, over the past few months she notices raspy voice and also bothered by frequent tonsil stones  No frequent tonsillitis  Has tried salt water, apple cider vinegar, peroxide, etc      Past Medical History:   Diagnosis Date    Anxiety     Colitis     Ulcerative colitis (HCC)        /70 (BP Location: Left arm, Cuff Size: Standard)   Pulse 75   Temp 97 6 °F (36 4 °C) (Temporal)   Ht 4' 11" (1 499 m)   Wt 58 5 kg (129 lb)   SpO2 99%   BMI 26 05 kg/m²       Physical Exam   Constitutional: Oriented to person, place, and time  Well-developed and well-nourished, no apparent distress, non-toxic appearance  Cooperative, able to hear and answer questions without difficulty  Voice: Raspy  Head: Normocephalic, atraumatic  No scars, masses or lesions  Face: Symmetric, no edema, no sinus tenderness  Eyes: Vision grossly intact, extra-ocular movement intact  Ears: External ear normal   Bilateral tympanic membranes are intact with intact normal landmarks  No post-auricular erythema or tenderness  Nose: Septum midline, nares clear  Mucosa moist, turbinates well appearing  No crusting, polyps or discharge evident  Oral cavity: Dentition intact  Mucosa moist, lips normal   Tongue mobile, floor of mouth normal   Hard palate unremarkable  No masses or lesions  Oropharynx: Uvula is midline, soft palate normal   Unremarkable oropharyngeal inlet  Tonsils 1-2+, stone extracted from L  Posterior pharyngeal wall clear  No masses or lesions  Salivary glands:  Parotid glands and submandibular glands symmetric, no enlargement or tenderness  Neck: Normal laryngeal elevation with swallow    Trachea midline  No masses or lesions  No palpable adenopathy  Thyroid: normal in size, unremarkable without tenderness or palpable nodules  Pulmonary/Chest: Normal effort and rate  No respiratory distress  Musculoskeletal: Normal range of motion  Neurological: Cranial nerves 2-12 intact  Skin: Skin is warm and dry  Psychiatric: Normal mood and affect  Procedure: Flexible fiberoptic laryngoscopy    Indications: Evaluate areas of the upper aerodigestive tract not seen on physical exam    Procedure in detail: After informed verbal consent was obtained the nose was anesthetized using 4% lidocaine and neosynephrine spray  After adequate time for anesthesia the scope was guided easily along the nasal cavity floor and into the nasopharynx  The nasopharynx, oropharynx, hypopharynx and larynx were evaluated with the below listed findings  The scope was removed without difficulty and the patient tolerated the procedure well  Findings: No significant mucoid purulence or polyposis in the nasal cavity  No lesions or masses of the nasopharynx, oropharynx, hypopharynx, or larynx  Airway is widely patent  Bilateral vocal folds are fully mobile  Small left mid cord polyp  A/P: Tonsil stone extracted from L tonsil  Discussed nature of tonsil stones and options for management  Continue salt water gargles  Encouraged frequent hydration which she admits has been difficult due to the nature of her work (works at [employer])  Discussed possible tonsillectomy if continues to be bothersome but there are associated risks  Noted to have left vocal cord polyp likely from voice overuse  States this has been a very busy season  She may benefit from repeat session of speech therapy but for now she will contact her speech therapist friend for advice  Can provide referral if needed  Follow up in 3-4 months for re evaluation and repeat scope

## 2021-08-10 NOTE — PROGRESS NOTES
Review of Systems   Constitutional: Negative  HENT: Positive for sore throat and voice change  Eyes: Negative  Respiratory: Negative  Cardiovascular: Negative  Gastrointestinal: Negative  Endocrine: Negative  Genitourinary: Negative  Musculoskeletal: Negative  Skin: Negative  Allergic/Immunologic: Negative  Neurological: Negative  Hematological: Negative  Psychiatric/Behavioral: Negative

## 2021-08-13 ENCOUNTER — TELEPHONE (OUTPATIENT)
Dept: GASTROENTEROLOGY | Facility: CLINIC | Age: 56
End: 2021-08-13

## 2021-08-13 NOTE — TELEPHONE ENCOUNTER
Received patients labs  Scanned into patient chart   Put in [address] folder

## 2021-08-16 ENCOUNTER — TELEPHONE (OUTPATIENT)
Dept: GASTROENTEROLOGY | Facility: CLINIC | Age: 56
End: 2021-08-16

## 2021-08-16 NOTE — TELEPHONE ENCOUNTER
----- Message from America Yuan PA-C sent at 8/16/2021 10:07 AM EDT -----  Please inform patient that the blood work showed that her CBC and LFTs were normal   Also, the markers of inflammation (CRP and sed rate) were normal and not elevated - good news!

## 2021-09-27 ENCOUNTER — TELEPHONE (OUTPATIENT)
Dept: GASTROENTEROLOGY | Facility: HOSPITAL | Age: 56
End: 2021-09-27

## 2021-09-28 ENCOUNTER — ANESTHESIA EVENT (OUTPATIENT)
Dept: GASTROENTEROLOGY | Facility: HOSPITAL | Age: 56
End: 2021-09-28

## 2021-09-28 ENCOUNTER — HOSPITAL ENCOUNTER (OUTPATIENT)
Dept: GASTROENTEROLOGY | Facility: HOSPITAL | Age: 56
Setting detail: OUTPATIENT SURGERY
Discharge: HOME | End: 2021-09-28
Admitting: INTERNAL MEDICINE
Payer: COMMERCIAL

## 2021-09-28 ENCOUNTER — ANESTHESIA (OUTPATIENT)
Dept: GASTROENTEROLOGY | Facility: HOSPITAL | Age: 56
End: 2021-09-28

## 2021-09-28 VITALS
TEMPERATURE: 98.5 F | DIASTOLIC BLOOD PRESSURE: 61 MMHG | BODY MASS INDEX: 25.96 KG/M2 | HEART RATE: 67 BPM | OXYGEN SATURATION: 98 % | HEIGHT: 59 IN | WEIGHT: 128.75 LBS | RESPIRATION RATE: 18 BRPM | SYSTOLIC BLOOD PRESSURE: 121 MMHG

## 2021-09-28 DIAGNOSIS — K51.919 ULCERATIVE COLITIS WITH COMPLICATION, UNSPECIFIED LOCATION (HCC): ICD-10-CM

## 2021-09-28 LAB
EXT PREGNANCY TEST URINE: NEGATIVE
EXT. CONTROL: NORMAL

## 2021-09-28 PROCEDURE — 88302 TISSUE EXAM BY PATHOLOGIST: CPT | Performed by: PATHOLOGY

## 2021-09-28 PROCEDURE — 88305 TISSUE EXAM BY PATHOLOGIST: CPT | Performed by: PATHOLOGY

## 2021-09-28 PROCEDURE — 45380 COLONOSCOPY AND BIOPSY: CPT | Performed by: INTERNAL MEDICINE

## 2021-09-28 PROCEDURE — 88342 IMHCHEM/IMCYTCHM 1ST ANTB: CPT | Performed by: PATHOLOGY

## 2021-09-28 PROCEDURE — 81025 URINE PREGNANCY TEST: CPT | Performed by: ANESTHESIOLOGY

## 2021-09-28 RX ORDER — PROPOFOL 10 MG/ML
INJECTION, EMULSION INTRAVENOUS AS NEEDED
Status: DISCONTINUED | OUTPATIENT
Start: 2021-09-28 | End: 2021-09-28

## 2021-09-28 RX ORDER — SODIUM CHLORIDE, SODIUM LACTATE, POTASSIUM CHLORIDE, CALCIUM CHLORIDE 600; 310; 30; 20 MG/100ML; MG/100ML; MG/100ML; MG/100ML
125 INJECTION, SOLUTION INTRAVENOUS CONTINUOUS
Status: DISCONTINUED | OUTPATIENT
Start: 2021-09-28 | End: 2021-10-02 | Stop reason: HOSPADM

## 2021-09-28 RX ORDER — LIDOCAINE HYDROCHLORIDE 10 MG/ML
INJECTION, SOLUTION EPIDURAL; INFILTRATION; INTRACAUDAL; PERINEURAL AS NEEDED
Status: DISCONTINUED | OUTPATIENT
Start: 2021-09-28 | End: 2021-09-28

## 2021-09-28 RX ADMIN — LIDOCAINE HYDROCHLORIDE 50 MG: 10 INJECTION, SOLUTION EPIDURAL; INFILTRATION; INTRACAUDAL; PERINEURAL at 08:09

## 2021-09-28 RX ADMIN — PROPOFOL 50 MG: 10 INJECTION, EMULSION INTRAVENOUS at 08:15

## 2021-09-28 RX ADMIN — PROPOFOL 50 MG: 10 INJECTION, EMULSION INTRAVENOUS at 08:11

## 2021-09-28 RX ADMIN — SODIUM CHLORIDE, SODIUM LACTATE, POTASSIUM CHLORIDE, AND CALCIUM CHLORIDE 125 ML/HR: .6; .31; .03; .02 INJECTION, SOLUTION INTRAVENOUS at 07:38

## 2021-09-28 RX ADMIN — PROPOFOL 100 MG: 10 INJECTION, EMULSION INTRAVENOUS at 08:09

## 2021-09-28 NOTE — ANESTHESIA PREPROCEDURE EVALUATION
Procedure:  COLONOSCOPY    Relevant Problems   No relevant active problems        Physical Exam    Airway    Mallampati score: II  TM Distance: >3 FB  Neck ROM: full     Dental   No notable dental hx     Cardiovascular  Rhythm: regular, Rate: normal, Cardiovascular exam normal    Pulmonary  Pulmonary exam normal Breath sounds clear to auscultation,     Other Findings        Anesthesia Plan  ASA Score- 2     Anesthesia Type- IV sedation with anesthesia with ASA Monitors  Additional Monitors:   Airway Plan:           Plan Factors-Exercise tolerance (METS): >4 METS  Chart reviewed  Patient is not a current smoker  Patient instructed to abstain from smoking on day of procedure  Patient did not smoke on day of surgery  There is medical exclusion for perioperative obstructive sleep apnea risk education  Induction- intravenous  Postoperative Plan-     Informed Consent- Anesthetic plan and risks discussed with patient  I personally reviewed this patient with the CRNA  Discussed and agreed on the Anesthesia Plan with the CRNA  Rm Powers

## 2021-09-28 NOTE — ANESTHESIA POSTPROCEDURE EVALUATION
Post-Op Assessment Note    CV Status:  Stable  Pain Score: 0    Pain management: adequate     Mental Status:  Sleepy   Hydration Status:  Euvolemic   PONV Controlled:  Controlled   Airway Patency:  Patent      Post Op Vitals Reviewed: Yes      Staff: CRNA         No complications documented      BP 93/54 (09/28/21 0821)    Temp 98 5 °F (36 9 °C) (09/28/21 0821)    Pulse 74 (09/28/21 0821)   Resp 20 (09/28/21 0821)    SpO2 99 % (09/28/21 0821)

## 2021-10-08 ENCOUNTER — TELEPHONE (OUTPATIENT)
Dept: GASTROENTEROLOGY | Facility: CLINIC | Age: 56
End: 2021-10-08

## 2021-10-11 ENCOUNTER — TELEPHONE (OUTPATIENT)
Dept: GASTROENTEROLOGY | Facility: CLINIC | Age: 56
End: 2021-10-11

## 2021-10-11 DIAGNOSIS — K51.919 ULCERATIVE COLITIS WITH COMPLICATION, UNSPECIFIED LOCATION (HCC): ICD-10-CM

## 2021-10-12 RX ORDER — DIPHENOXYLATE HYDROCHLORIDE AND ATROPINE SULFATE 2.5; .025 MG/1; MG/1
2 TABLET ORAL 4 TIMES DAILY PRN
Qty: 60 TABLET | Refills: 3 | Status: SHIPPED | OUTPATIENT
Start: 2021-10-12 | End: 2021-10-21 | Stop reason: SDUPTHER

## 2021-10-22 RX ORDER — DIPHENOXYLATE HYDROCHLORIDE AND ATROPINE SULFATE 2.5; .025 MG/1; MG/1
2 TABLET ORAL 4 TIMES DAILY PRN
Qty: 60 TABLET | Refills: 3 | Status: SHIPPED | OUTPATIENT
Start: 2021-10-22

## 2021-11-15 ENCOUNTER — TELEPHONE (OUTPATIENT)
Dept: GASTROENTEROLOGY | Facility: CLINIC | Age: 56
End: 2021-11-15

## 2021-12-16 DIAGNOSIS — K51.919 ULCERATIVE COLITIS WITH COMPLICATION, UNSPECIFIED LOCATION (HCC): ICD-10-CM

## 2021-12-16 RX ORDER — ADALIMUMAB 40MG/0.4ML
KIT SUBCUTANEOUS
Qty: 2 EACH | Refills: 10 | Status: SHIPPED | OUTPATIENT
Start: 2021-12-16 | End: 2022-01-11 | Stop reason: SDUPTHER

## 2022-01-11 ENCOUNTER — TELEPHONE (OUTPATIENT)
Dept: GASTROENTEROLOGY | Facility: CLINIC | Age: 57
End: 2022-01-11

## 2022-01-11 RX ORDER — ADALIMUMAB 40MG/0.4ML
KIT SUBCUTANEOUS
Qty: 2 EACH | Refills: 11 | Status: SHIPPED | OUTPATIENT
Start: 2022-01-11

## 2022-04-29 ENCOUNTER — TELEPHONE (OUTPATIENT)
Dept: GASTROENTEROLOGY | Facility: CLINIC | Age: 57
End: 2022-04-29

## 2022-04-29 NOTE — TELEPHONE ENCOUNTER
Celestino Barrientos called she wanted to speak with you  She is not doing well  Feels it is due to stress  Would like to run something by you  Please call Prakash Alonso at [phone]  She is going out of town for 4 days  OV on 06/07/22 with you

## 2022-04-29 NOTE — TELEPHONE ENCOUNTER
Spoke with patient  She is under significant amount of stress  She had 1 episode of diarrhea with bright red blood  She is tolerating a diet  She denies any severe abdominal pain  She is actually getting ready to go way this week and she will contact me on Monday when she gets back

## 2022-05-02 ENCOUNTER — TELEPHONE (OUTPATIENT)
Dept: GASTROENTEROLOGY | Facility: CLINIC | Age: 57
End: 2022-05-02

## 2022-05-02 NOTE — TELEPHONE ENCOUNTER
Ping patient - Patient called and said she is a little better but not much  Please RTRN call to [phone]    Thx

## 2022-05-03 ENCOUNTER — TELEPHONE (OUTPATIENT)
Dept: GASTROENTEROLOGY | Facility: CLINIC | Age: 57
End: 2022-05-03

## 2022-05-03 NOTE — TELEPHONE ENCOUNTER
Spoke with patient  She is flaring slightly  Will start low-dose prednisone taper  Will update labs

## 2022-05-03 NOTE — TELEPHONE ENCOUNTER
Ping patient - Patient apoogized for missing yesterdays call and would like you to RTRN her call    Thx

## 2022-05-05 ENCOUNTER — APPOINTMENT (OUTPATIENT)
Dept: LAB | Facility: CLINIC | Age: 57
End: 2022-05-05
Payer: COMMERCIAL

## 2022-05-05 DIAGNOSIS — K51.919 ULCERATIVE COLITIS WITH COMPLICATION, UNSPECIFIED LOCATION (HCC): ICD-10-CM

## 2022-05-05 LAB
ALBUMIN SERPL BCP-MCNC: 3.8 G/DL (ref 3.5–5)
ALP SERPL-CCNC: 75 U/L (ref 46–116)
ALT SERPL W P-5'-P-CCNC: 21 U/L (ref 12–78)
ANION GAP SERPL CALCULATED.3IONS-SCNC: 6 MMOL/L (ref 4–13)
AST SERPL W P-5'-P-CCNC: 19 U/L (ref 5–45)
BASOPHILS # BLD AUTO: 0.06 THOUSANDS/ΜL (ref 0–0.1)
BASOPHILS NFR BLD AUTO: 0 % (ref 0–1)
BILIRUB SERPL-MCNC: 0.22 MG/DL (ref 0.2–1)
BUN SERPL-MCNC: 17 MG/DL (ref 5–25)
CALCIUM SERPL-MCNC: 9 MG/DL (ref 8.3–10.1)
CHLORIDE SERPL-SCNC: 112 MMOL/L (ref 100–108)
CO2 SERPL-SCNC: 23 MMOL/L (ref 21–32)
CREAT SERPL-MCNC: 0.89 MG/DL (ref 0.6–1.3)
CRP SERPL QL: <3 MG/L
EOSINOPHIL # BLD AUTO: 0.02 THOUSAND/ΜL (ref 0–0.61)
EOSINOPHIL NFR BLD AUTO: 0 % (ref 0–6)
ERYTHROCYTE [DISTWIDTH] IN BLOOD BY AUTOMATED COUNT: 14.7 % (ref 11.6–15.1)
ERYTHROCYTE [SEDIMENTATION RATE] IN BLOOD: 12 MM/HOUR (ref 0–29)
GFR SERPL CREATININE-BSD FRML MDRD: 72 ML/MIN/1.73SQ M
GLUCOSE P FAST SERPL-MCNC: 104 MG/DL (ref 65–99)
HCT VFR BLD AUTO: 25 % (ref 34.8–46.1)
HGB BLD-MCNC: 7.9 G/DL (ref 11.5–15.4)
IMM GRANULOCYTES # BLD AUTO: 0.1 THOUSAND/UL (ref 0–0.2)
IMM GRANULOCYTES NFR BLD AUTO: 1 % (ref 0–2)
LYMPHOCYTES # BLD AUTO: 1.58 THOUSANDS/ΜL (ref 0.6–4.47)
LYMPHOCYTES NFR BLD AUTO: 11 % (ref 14–44)
MCH RBC QN AUTO: 31.2 PG (ref 26.8–34.3)
MCHC RBC AUTO-ENTMCNC: 31.6 G/DL (ref 31.4–37.4)
MCV RBC AUTO: 99 FL (ref 82–98)
MONOCYTES # BLD AUTO: 0.73 THOUSAND/ΜL (ref 0.17–1.22)
MONOCYTES NFR BLD AUTO: 5 % (ref 4–12)
NEUTROPHILS # BLD AUTO: 11.6 THOUSANDS/ΜL (ref 1.85–7.62)
NEUTS SEG NFR BLD AUTO: 83 % (ref 43–75)
NRBC BLD AUTO-RTO: 0 /100 WBCS
PLATELET # BLD AUTO: 364 THOUSANDS/UL (ref 149–390)
PMV BLD AUTO: 10.8 FL (ref 8.9–12.7)
POTASSIUM SERPL-SCNC: 3.5 MMOL/L (ref 3.5–5.3)
PROT SERPL-MCNC: 7.5 G/DL (ref 6.4–8.2)
RBC # BLD AUTO: 2.53 MILLION/UL (ref 3.81–5.12)
SODIUM SERPL-SCNC: 141 MMOL/L (ref 136–145)
WBC # BLD AUTO: 14.09 THOUSAND/UL (ref 4.31–10.16)

## 2022-05-05 PROCEDURE — 86140 C-REACTIVE PROTEIN: CPT

## 2022-05-05 PROCEDURE — 80053 COMPREHEN METABOLIC PANEL: CPT

## 2022-05-05 PROCEDURE — 85025 COMPLETE CBC W/AUTO DIFF WBC: CPT

## 2022-05-05 PROCEDURE — 85652 RBC SED RATE AUTOMATED: CPT

## 2022-05-05 PROCEDURE — 36415 COLL VENOUS BLD VENIPUNCTURE: CPT

## 2022-05-06 ENCOUNTER — TELEPHONE (OUTPATIENT)
Dept: GASTROENTEROLOGY | Facility: CLINIC | Age: 57
End: 2022-05-06

## 2022-05-06 ENCOUNTER — PREP FOR PROCEDURE (OUTPATIENT)
Dept: GASTROENTEROLOGY | Facility: CLINIC | Age: 57
End: 2022-05-06

## 2022-05-06 DIAGNOSIS — R19.5 DARK STOOLS: Primary | ICD-10-CM

## 2022-05-06 DIAGNOSIS — R53.83 FATIGUE, UNSPECIFIED TYPE: ICD-10-CM

## 2022-05-06 NOTE — TELEPHONE ENCOUNTER
----- Message from Robyn Urena PA-C sent at 5/6/2022  1:41 PM EDT -----  Patient reports dark stools not black  She is fatigued and dizzy  She will restart PPI daily   Can we please schedule an EGD for ASAP thank you!!

## 2022-05-06 NOTE — TELEPHONE ENCOUNTER
Spoke with the patient and she scheduled her EGD  Prep instructions discussed verbally      Scheduled date of EGD(as of today):5/18/22  Physician performing EGD:Deneen  Location of EGD:[facility]  Instructions reviewed with patient by:Julisa METCALF  Clearances: none

## 2022-05-16 ENCOUNTER — TELEPHONE (OUTPATIENT)
Dept: GASTROENTEROLOGY | Facility: CLINIC | Age: 57
End: 2022-05-16

## 2022-05-16 NOTE — TELEPHONE ENCOUNTER
Deneen patient - Patient LMOM to ask about taking Humira B4 or after procedure on Wed 5/18  I advised after and she said that she usually waits until Thursday to take it anyway    Thx

## 2022-05-17 RX ORDER — SODIUM CHLORIDE, SODIUM LACTATE, POTASSIUM CHLORIDE, CALCIUM CHLORIDE 600; 310; 30; 20 MG/100ML; MG/100ML; MG/100ML; MG/100ML
125 INJECTION, SOLUTION INTRAVENOUS CONTINUOUS
Status: CANCELLED | OUTPATIENT
Start: 2022-05-17

## 2022-05-18 ENCOUNTER — ANESTHESIA EVENT (OUTPATIENT)
Dept: GASTROENTEROLOGY | Facility: HOSPITAL | Age: 57
End: 2022-05-18

## 2022-05-18 ENCOUNTER — ANESTHESIA (OUTPATIENT)
Dept: GASTROENTEROLOGY | Facility: HOSPITAL | Age: 57
End: 2022-05-18

## 2022-05-18 ENCOUNTER — HOSPITAL ENCOUNTER (OUTPATIENT)
Dept: GASTROENTEROLOGY | Facility: HOSPITAL | Age: 57
Setting detail: OUTPATIENT SURGERY
Discharge: HOME | End: 2022-05-18
Attending: INTERNAL MEDICINE | Admitting: INTERNAL MEDICINE
Payer: COMMERCIAL

## 2022-05-18 VITALS
SYSTOLIC BLOOD PRESSURE: 102 MMHG | WEIGHT: 121.25 LBS | RESPIRATION RATE: 16 BRPM | BODY MASS INDEX: 24.44 KG/M2 | HEIGHT: 59 IN | HEART RATE: 55 BPM | OXYGEN SATURATION: 100 % | DIASTOLIC BLOOD PRESSURE: 58 MMHG | TEMPERATURE: 97.8 F

## 2022-05-18 DIAGNOSIS — R19.5 DARK STOOLS: ICD-10-CM

## 2022-05-18 DIAGNOSIS — R53.83 FATIGUE, UNSPECIFIED TYPE: ICD-10-CM

## 2022-05-18 PROBLEM — K51.90 ULCERATIVE COLITIS (HCC): Status: ACTIVE | Noted: 2022-05-18

## 2022-05-18 PROBLEM — F41.9 ANXIETY: Status: ACTIVE | Noted: 2022-05-18

## 2022-05-18 PROCEDURE — 88305 TISSUE EXAM BY PATHOLOGIST: CPT | Performed by: PATHOLOGY

## 2022-05-18 PROCEDURE — 43239 EGD BIOPSY SINGLE/MULTIPLE: CPT | Performed by: INTERNAL MEDICINE

## 2022-05-18 RX ORDER — SODIUM CHLORIDE, SODIUM LACTATE, POTASSIUM CHLORIDE, CALCIUM CHLORIDE 600; 310; 30; 20 MG/100ML; MG/100ML; MG/100ML; MG/100ML
125 INJECTION, SOLUTION INTRAVENOUS CONTINUOUS
Status: DISCONTINUED | OUTPATIENT
Start: 2022-05-18 | End: 2022-05-22 | Stop reason: HOSPADM

## 2022-05-18 RX ORDER — PROPOFOL 10 MG/ML
INJECTION, EMULSION INTRAVENOUS AS NEEDED
Status: DISCONTINUED | OUTPATIENT
Start: 2022-05-18 | End: 2022-05-18

## 2022-05-18 RX ORDER — SODIUM CHLORIDE, SODIUM LACTATE, POTASSIUM CHLORIDE, CALCIUM CHLORIDE 600; 310; 30; 20 MG/100ML; MG/100ML; MG/100ML; MG/100ML
INJECTION, SOLUTION INTRAVENOUS CONTINUOUS PRN
Status: DISCONTINUED | OUTPATIENT
Start: 2022-05-18 | End: 2022-05-18

## 2022-05-18 RX ORDER — LIDOCAINE HYDROCHLORIDE 20 MG/ML
INJECTION, SOLUTION EPIDURAL; INFILTRATION; INTRACAUDAL; PERINEURAL AS NEEDED
Status: DISCONTINUED | OUTPATIENT
Start: 2022-05-18 | End: 2022-05-18

## 2022-05-18 RX ADMIN — PROPOFOL 150 MG: 10 INJECTION, EMULSION INTRAVENOUS at 13:34

## 2022-05-18 RX ADMIN — LIDOCAINE HYDROCHLORIDE 100 MG: 20 INJECTION, SOLUTION EPIDURAL; INFILTRATION; INTRACAUDAL at 13:33

## 2022-05-18 RX ADMIN — SODIUM CHLORIDE, SODIUM LACTATE, POTASSIUM CHLORIDE, AND CALCIUM CHLORIDE: .6; .31; .03; .02 INJECTION, SOLUTION INTRAVENOUS at 12:23

## 2022-05-18 NOTE — H&P
History and Physical -  [facility]  Sam Reagan 64 y o  female MRN: [id number]      HPI: Sam Reagan is a 64y o  year old female who presents for chronic blood loss anemia      REVIEW OF SYSTEMS: Per the HPI, and otherwise unremarkable  Historical Information   Past Medical History:   Diagnosis Date    Anxiety     Colitis     Ulcerative [address] ([facility] [address] )      Past Surgical History:   Procedure Laterality Date     SECTION      CHOLECYSTECTOMY      COLONOSCOPY      TOOTH EXTRACTION      TUBAL LIGATION       Social History   Social History     Substance and Sexual Activity   Alcohol Use Yes    Alcohol/week: 4 0 standard drinks    Types: 4 Cans of beer per week     Social History     Substance and Sexual Activity   Drug Use No     Social History     Tobacco Use   Smoking Status Never Smoker   Smokeless Tobacco Never Used     Family History   Problem Relation Age of Onset    No Known Problems Mother     Stroke Father     Hypertension Father        Meds/Allergies     (Not in a hospital admission)      Allergies   Allergen Reactions    Thimerosal Hives       Objective     Blood pressure 117/68, pulse 58, temperature 99 °F (37 2 °C), temperature source Temporal, resp  rate 15, height 4' 11" (1 499 m), weight 55 kg (121 lb 4 1 oz), last menstrual period 2021, SpO2 100 %  PHYSICAL EXAM    Gen: NAD  CV: RRR  CHEST: Clear  ABD: soft, NT/ND  EXT: no edema      ASSESSMENT/PLAN:  This is a 64y o  year old female here for EGD with possible biopsy, and she is stable and optimized for her procedure

## 2022-05-18 NOTE — ANESTHESIA POSTPROCEDURE EVALUATION
Post-Op Assessment Note    CV Status:  Stable  Pain Score: 0    Pain management: adequate     Mental Status:  Alert and awake   Hydration Status:  Euvolemic   PONV Controlled:  Controlled   Airway Patency:  Patent      Post Op Vitals Reviewed: Yes      Staff: Anesthesiologist, CRNA         No complications documented      /59 (05/18/22 1343)    Temp 97 8 °F (36 6 °C) (05/18/22 1343)    Pulse 61 (05/18/22 1343)   Resp 15 (05/18/22 1343)    SpO2 100 % (05/18/22 1343)

## 2022-05-18 NOTE — ANESTHESIA PREPROCEDURE EVALUATION
Procedure:  EGD    Relevant Problems   ANESTHESIA (within normal limits)   (-) History of anesthesia complications      CARDIO (within normal limits)      ENDO (within normal limits)      GI/HEPATIC  Confirmed NPO appropriate  Ulcerative colitis, currently on steroid taper for recent flare (prior flare nearly 5 years ago, well-controlled with Humira)      /RENAL (within normal limits)      HEMATOLOGY (within normal limits)      MUSCULOSKELETAL (within normal limits)      NEURO/PSYCH   (+) Anxiety      PULMONARY (within normal limits)   (-) Smoking   (-) URI (upper respiratory infection)      Digestive   (+) Ulcerative colitis (HCC)        Physical Exam    Airway    Mallampati score: II  TM Distance: >3 FB  Neck ROM: full     Dental   No notable dental hx     Cardiovascular  Rhythm: regular, Rate: abnormal,     Pulmonary  Pulmonary exam normal     Other Findings        Anesthesia Plan  ASA Score- 2     Anesthesia Type- IV sedation with anesthesia with ASA Monitors  Additional Monitors:   Airway Plan:     Comment: I discussed the risks and benefits of IV sedation anesthesia including the possibility of the need to convert to general anesthesia and the potential risk of awareness  The patient was given the opportunity to ask questions, which were answered          Plan Factors-Exercise tolerance (METS): >4 METS  Chart reviewed  Patient is not a current smoker  Induction- intravenous  Postoperative Plan-     Informed Consent- Anesthetic plan and risks discussed with patient  I personally reviewed this patient with the CRNA  Discussed and agreed on the Anesthesia Plan with the CRNA  Carolina Veliz

## 2022-06-01 ENCOUNTER — TELEPHONE (OUTPATIENT)
Dept: GASTROENTEROLOGY | Facility: CLINIC | Age: 57
End: 2022-06-01

## 2022-06-01 NOTE — TELEPHONE ENCOUNTER
----- Message from Nida Chowdary DO sent at 6/1/2022 12:37 PM EDT -----  Please call the patient with the biopsy results  Biopsies the small bowel were benign and negative for celiac disease or cancer  The biopsies the stomach were benign and negative for Helicobacter pylori or for cancer    The

## 2022-06-06 RX ORDER — CHLORHEXIDINE GLUCONATE 0.12 MG/ML
RINSE ORAL
COMMUNITY
Start: 2022-04-13

## 2022-06-07 ENCOUNTER — OFFICE VISIT (OUTPATIENT)
Dept: GASTROENTEROLOGY | Facility: CLINIC | Age: 57
End: 2022-06-07
Payer: COMMERCIAL

## 2022-06-07 VITALS
HEIGHT: 59 IN | BODY MASS INDEX: 24.84 KG/M2 | HEART RATE: 69 BPM | OXYGEN SATURATION: 99 % | WEIGHT: 123.2 LBS | SYSTOLIC BLOOD PRESSURE: 110 MMHG | DIASTOLIC BLOOD PRESSURE: 78 MMHG

## 2022-06-07 DIAGNOSIS — K51.919 ULCERATIVE COLITIS WITH COMPLICATION, UNSPECIFIED LOCATION (HCC): ICD-10-CM

## 2022-06-07 DIAGNOSIS — R53.83 FATIGUE, UNSPECIFIED TYPE: Primary | ICD-10-CM

## 2022-06-07 PROCEDURE — 99213 OFFICE O/P EST LOW 20 MIN: CPT | Performed by: PHYSICIAN ASSISTANT

## 2022-06-07 NOTE — PROGRESS NOTES
[facility] - Outpatient Follow-up Note  Teri Baker 64 y o  female MRN: [id number]  Encounter: [id number]          ASSESSMENT AND PLAN:      1  Fatigue, unspecified type  2  Ulcerative colitis with complication, unspecified location ([facility] )  -Will update CBC and iron panel   -Will continue Humira every other week  -Will utilize Bentyl for abdominal pain     -Follow-up in 6 months   ______________________________________________________________________    SUBJECTIVE:    51-year-old female with a past medical history significant for ulcerative colitis and anxiety  Patient presents for follow-up after her EGD  Patient's EGD was completed in May due to a chief complaint of melena and acute drop in hemoglobin  Patient's hemoglobin level last month was 7 9  Patient reports she was also having a flare-up of her ulcerative colitis  Patient was under significant stress with 2 deaths in the family  Patient had called the office several weeks ago with a chief complaint of rectal bleeding as well as diarrhea and abdominal pain  Patient just completed a prednisone taper which unfortunately caused her uveitis  She is still reporting occasional right lower quadrant abdominal cramping which she is going to utilize Bentyl for  REVIEW OF SYSTEMS IS OTHERWISE NEGATIVE        Historical Information   Past Medical History:   Diagnosis Date    Anxiety     Colitis     Ulcerative colitis ([facility] )      Past Surgical History:   Procedure Laterality Date     SECTION      CHOLECYSTECTOMY      COLONOSCOPY      TOOTH EXTRACTION      TUBAL LIGATION       Social History   Social History     Substance and Sexual Activity   Alcohol Use Yes    Alcohol/week: 4 0 standard drinks    Types: 4 Cans of beer per week     Social History     Substance and Sexual Activity   Drug Use No     Social History     Tobacco Use   Smoking Status Never Smoker   Smokeless Tobacco Never Used     Family History Problem Relation Age of Onset    No Known Problems Mother     Stroke Father     Hypertension Father        Meds/Allergies       Current Outpatient Medications:     Adalimumab (Humira Pen) 40 MG/0 4ML PNKT    chlorhexidine (PERIDEX) 0 12 % solution    citalopram (CeleXA) 10 mg tablet    clobetasol (TEMOVATE) 0 05 % cream    clonazePAM (KlonoPIN) 0 5 mg tablet    diphenoxylate-atropine (Lomotil) 2 5-0 025 mg per tablet    ibuprofen (MOTRIN) 600 mg tablet    pantoprazole (PROTONIX) 40 mg tablet    dicyclomine (BENTYL) 20 mg tablet    predniSONE 10 mg tablet    Allergies   Allergen Reactions    Thimerosal Hives           Objective     Blood pressure 110/78, pulse 69, height 4' 11" (1 499 m), weight 55 9 kg (123 lb 3 2 oz), last menstrual period 07/04/2021, SpO2 99 %  Body mass index is 24 88 kg/m²  PHYSICAL EXAM:      General Appearance:   Alert, cooperative, no distress   HEENT:   Normocephalic, atraumatic, anicteric      Neck:  Supple, symmetrical, trachea midline   Lungs:   Clear to auscultation bilaterally; no rales, rhonchi or wheezing; respirations unlabored    Heart[de-identified]   Regular rate and rhythm; no murmur, rub, or gallop  Abdomen:   Soft, non-tender, non-distended; normal bowel sounds; no masses, no organomegaly    Genitalia:   Deferred    Rectal:   Deferred    Extremities:  No cyanosis, clubbing or edema    Pulses:  2+ and symmetric    Skin:  No jaundice, rashes, or lesions    Lymph nodes:  No palpable cervical lymphadenopathy        Lab Results:   No visits with results within 1 Day(s) from this visit     Latest known visit with results is:   Hospital Outpatient Visit on 05/18/2022   Component Date Value    Case Report 05/18/2022                      Value:Surgical Pathology Report                         Case: [id number]                                   Authorizing Provider:  Marc Coronado DO          Collected:           05/18/2022 1519              Ordering Location:      [facility]       Received:            05/18/2022 [address] [facility]                                                             Pathologist:           Chata Lopez MD                                                          Specimens:   A) - Small Bowel, NOS                                                                               B) - Stomach, antrum                                                                       Final Diagnosis 05/18/2022                      Value: This result contains rich text formatting which cannot be displayed here   Additional Information 05/18/2022                      Value: This result contains rich text formatting which cannot be displayed here  Ligia Bell Gross Description 05/18/2022                      Value: This result contains rich text formatting which cannot be displayed here   Clinical Information 05/18/2022                      Value:Cold bx r/o celiac         Radiology Results:   EGD    Result Date: 5/18/2022  N[facility]ative:  [facility] [address] [phone] DATE OF SERVICE: 5/18/22 PHYSICIAN(S): Attending: Hayden Sebastian DO Fellow: No Staff Documented INDICATION: Dark stools, Fatigue, unspecified type POST-OP DIAGNOSIS: See the impression below  PREPROCEDURE: Informed consent was obtained for the procedure, including sedation  Risks of perforation, hemorrhage, adverse drug reaction and aspiration were discussed  The patient was placed in the left lateral decubitus position  Patient was explained about the risks and benefits of the procedure  Risks including but not limited to bleeding, infection, and perforation were explained in detail  Also explained about less than 100% sensitivity with the exam and other alternatives  DETAILS OF PROCEDURE: Patient was taken to the procedure room where a time out was performed to confirm correct patient and correct procedure   The patient underwent monitored anesthesia care, which was administered by an anesthesia professional  The patient's blood pressure, heart rate, level of consciousness, respirations and oxygen were monitored throughout the procedure  The scope was advanced to the second part of the duodenum  Retroflexion was performed in the fundus  Prior to the procedure, the patient's H  Pylori status was unknown  The patient's estimated blood loss was minimal (<5 mL)  The procedure was not difficult  The patient tolerated the procedure well  There were no apparent complications  ANESTHESIA INFORMATION: ASA: II Anesthesia Type: IV Sedation with Anesthesia MEDICATIONS: No administrations occurring from 1329 to 1342 on 05/18/22 FINDINGS: The cricopharynx, upper third of the esophagus, middle third of the esophagus, lower third of the esophagus, GE junction and Z-line appeared normal  Z-line is 39 cm from the incisors  The cardia, fundus of the stomach, body of the stomach, greater curve of the stomach, lesser curve of the stomach, incisura, antrum, prepyloric region and pylorus appeared normal  The duodenal bulb and 2nd part of the duodenum appeared normal  SPECIMENS: ID Type Source Tests Collected by Time Destination 1 :  Tissue Small Bowel, NOS TISSUE EXAM Renetta Reece DO 5/18/2022  1:39 PM  2 : antrum Tissue Stomach TISSUE EXAM Renetta Reece DO 5/18/2022  1:39 PM      Impression: 1  Normal esophagogastroduodenoscopy RECOMMENDATION: 1   Will call the patient in 1 week regarding the biopsy results   Renetta Reece DO [place], [place]

## 2022-06-07 NOTE — LETTER
June 7, 2022     Marlene Rankin PA-C  [address]  [address] [address]    Patient: Nat Brown   YOB: 1965   Date of Visit: 6/7/2022       Dear Dr Ashwin Fatima: Thank you for referring Nat Brown to me for evaluation  Below are my notes for this consultation  If you have questions, please do not hesitate to call me  I look forward to following your patient along with you  Sincerely,        Jessenia Lowry PA-C        CC: No Recipients  Fay Jimenez  6/7/2022 10:56 AM  Sign when Signing Visit  [facility] - Outpatient Follow-up Note  Nat Brown 64 y o  female MRN: [id number]  Encounter: [id number]          ASSESSMENT AND PLAN:      1  Fatigue, unspecified type  2  Ulcerative colitis with complication, unspecified location ([facility] )  -Will update CBC and iron panel   -Will continue Humira every other week  -Will utilize Bentyl for abdominal pain     -Follow-up in 6 months   ______________________________________________________________________    SUBJECTIVE:    57-year-old female with a past medical history significant for ulcerative colitis and anxiety  Patient presents for follow-up after her EGD  Patient's EGD was completed in May due to a chief complaint of melena and acute drop in hemoglobin  Patient's hemoglobin level last month was 7 9  Patient reports she was also having a flare-up of her ulcerative colitis  Patient was under significant stress with 2 deaths in the family  Patient had called the office several weeks ago with a chief complaint of rectal bleeding as well as diarrhea and abdominal pain  Patient just completed a prednisone taper which unfortunately caused her uveitis  She is still reporting occasional right lower quadrant abdominal cramping which she is going to utilize Bentyl for  REVIEW OF SYSTEMS IS OTHERWISE NEGATIVE        Historical Information   Past Medical History:   Diagnosis Date    Anxiety     Colitis     Ulcerative colitis ([facility] )      Past Surgical History:   Procedure Laterality Date     SECTION      CHOLECYSTECTOMY      COLONOSCOPY      TOOTH EXTRACTION      TUBAL LIGATION       Social History   Social History     Substance and Sexual Activity   Alcohol Use Yes    Alcohol/week: 4 0 standard drinks    Types: 4 Cans of beer per week     Social History     Substance and Sexual Activity   Drug Use No     Social History     Tobacco Use   Smoking Status Never Smoker   Smokeless Tobacco Never Used     Family History   Problem Relation Age of Onset    No Known Problems Mother     Stroke Father     Hypertension Father        Meds/Allergies       Current Outpatient Medications:     Adalimumab (Humira Pen) 40 MG/0 4ML PNKT    chlorhexidine (PERIDEX) 0 12 % solution    citalopram (CeleXA) 10 mg tablet    clobetasol (TEMOVATE) 0 05 % cream    clonazePAM (KlonoPIN) 0 5 mg tablet    diphenoxylate-atropine (Lomotil) 2 5-0 025 mg per tablet    ibuprofen (MOTRIN) 600 mg tablet    pantoprazole (PROTONIX) 40 mg tablet    dicyclomine (BENTYL) 20 mg tablet    predniSONE 10 mg tablet    Allergies   Allergen Reactions    Thimerosal Hives           Objective     Blood pressure 110/78, pulse 69, height 4' 11" (1 499 m), weight 55 9 kg (123 lb 3 2 oz), last menstrual period 2021, SpO2 99 %  Body mass index is 24 88 kg/m²  PHYSICAL EXAM:      General Appearance:   Alert, cooperative, no distress   HEENT:   Normocephalic, atraumatic, anicteric      Neck:  Supple, symmetrical, trachea midline   Lungs:   Clear to auscultation bilaterally; no rales, rhonchi or wheezing; respirations unlabored    Heart[de-identified]   Regular rate and rhythm; no murmur, rub, or gallop     Abdomen:   Soft, non-tender, non-distended; normal bowel sounds; no masses, no organomegaly    Genitalia:   Deferred    Rectal:   Deferred    Extremities:  No cyanosis, clubbing or edema    Pulses:  2+ and symmetric    Skin:  No jaundice, rashes, or lesions    Lymph nodes:  No palpable cervical lymphadenopathy        Lab Results:   No visits with results within 1 Day(s) from this visit  Latest known visit with results is:   Hospital Outpatient Visit on 05/18/2022   Component Date Value    Case Report 05/18/2022                      Value:Surgical Pathology Report                         Case: [id number]                                   Authorizing Provider:  Johnathon Law DO          Collected:           05/18/2022 1339              Ordering Location:      [facility]       Received:            05/18/2022 [address] [place] [address] Endoscopy                                                             Pathologist:           Lucretia Steele MD                                                          Specimens:   A) - Small Bowel, NOS                                                                               B) - Stomach, antrum                                                                       Final Diagnosis 05/18/2022                      Value: This result contains rich text formatting which cannot be displayed here   Additional Information 05/18/2022                      Value: This result contains rich text formatting which cannot be displayed here  Beau McKean Gross Description 05/18/2022                      Value: This result contains rich text formatting which cannot be displayed here   Clinical Information 05/18/2022                      Value:Cold bx r/o celiac         Radiology Results:   EGD    Result Date: 5/18/2022  Narrative:  [id number] [facility] [address] [place] [address] [phone] DATE OF SERVICE: 5/18/22 PHYSICIAN(S): Attending: Johnathon Law DO Fellow: No Staff Documented INDICATION: Dark stools, Fatigue, unspecified type POST-OP DIAGNOSIS: See the impression below  PREPROCEDURE: Informed consent was obtained for the procedure, including sedation  Risks of perforation, hemorrhage, adverse drug reaction and aspiration were discussed  The patient was placed in the left lateral decubitus position  Patient was explained about the risks and benefits of the procedure  Risks including but not limited to bleeding, infection, and perforation were explained in detail  Also explained about less than 100% sensitivity with the exam and other alternatives  DETAILS OF PROCEDURE: Patient was taken to the procedure room where a time out was performed to confirm correct patient and correct procedure  The patient underwent monitored anesthesia care, which was administered by an anesthesia professional  The patient's blood pressure, heart rate, level of consciousness, respirations and oxygen were monitored throughout the procedure  The scope was advanced to the second part of the duodenum  Retroflexion was performed in the fundus  Prior to the procedure, the patient's H  Pylori status was unknown  The patient's estimated blood loss was minimal (<5 mL)  The procedure was not difficult  The patient tolerated the procedure well  There were no apparent complications  ANESTHESIA INFORMATION: ASA: II Anesthesia Type: IV Sedation with Anesthesia MEDICATIONS: No administrations occurring from 1329 to 1342 on 05/18/22 FINDINGS: The cricopharynx, upper third of the esophagus, middle third of the esophagus, lower third of the esophagus, GE junction and Z-line appeared normal  Z-line is 39 cm from the incisors  The cardia, fundus of the stomach, body of the stomach, greater curve of the stomach, lesser curve of the stomach, incisura, antrum, prepyloric region and pylorus appeared normal  The duodenal bulb and 2nd part of the duodenum appeared normal  SPECIMENS: ID Type Source Tests Collected by Time Destination 1 :  Tissue Small Bowel, NOS TISSUE EXAM Kev Henry DO 5/18/2022  1:39 PM  2 : antrum Tissue Stomach TISSUE EXAM Kev Henry DO 5/18/2022  1:39 PM      Impression: 1   Normal esophagogastroduodenoscopy RECOMMENDATION: 1   Will call the patient in 1 week regarding the biopsy results   DO Vamshi Ho [facility] [place]

## 2022-06-09 ENCOUNTER — APPOINTMENT (OUTPATIENT)
Dept: LAB | Facility: CLINIC | Age: 57
End: 2022-06-09
Payer: COMMERCIAL

## 2022-06-09 DIAGNOSIS — K51.919 ULCERATIVE COLITIS WITH COMPLICATION, UNSPECIFIED LOCATION (HCC): ICD-10-CM

## 2022-06-09 DIAGNOSIS — R53.83 FATIGUE, UNSPECIFIED TYPE: ICD-10-CM

## 2022-06-09 LAB
BASOPHILS # BLD AUTO: 0.08 THOUSANDS/ΜL (ref 0–0.1)
BASOPHILS NFR BLD AUTO: 1 % (ref 0–1)
EOSINOPHIL # BLD AUTO: 0.11 THOUSAND/ΜL (ref 0–0.61)
EOSINOPHIL NFR BLD AUTO: 2 % (ref 0–6)
ERYTHROCYTE [DISTWIDTH] IN BLOOD BY AUTOMATED COUNT: 14 % (ref 11.6–15.1)
FERRITIN SERPL-MCNC: 26 NG/ML (ref 8–388)
HCT VFR BLD AUTO: 35.7 % (ref 34.8–46.1)
HGB BLD-MCNC: 11.1 G/DL (ref 11.5–15.4)
IMM GRANULOCYTES # BLD AUTO: 0.02 THOUSAND/UL (ref 0–0.2)
IMM GRANULOCYTES NFR BLD AUTO: 0 % (ref 0–2)
IRON SATN MFR SERPL: 7 % (ref 15–50)
IRON SERPL-MCNC: 27 UG/DL (ref 50–170)
LYMPHOCYTES # BLD AUTO: 2.64 THOUSANDS/ΜL (ref 0.6–4.47)
LYMPHOCYTES NFR BLD AUTO: 35 % (ref 14–44)
MCH RBC QN AUTO: 30.5 PG (ref 26.8–34.3)
MCHC RBC AUTO-ENTMCNC: 31.1 G/DL (ref 31.4–37.4)
MCV RBC AUTO: 98 FL (ref 82–98)
MONOCYTES # BLD AUTO: 0.56 THOUSAND/ΜL (ref 0.17–1.22)
MONOCYTES NFR BLD AUTO: 7 % (ref 4–12)
NEUTROPHILS # BLD AUTO: 4.12 THOUSANDS/ΜL (ref 1.85–7.62)
NEUTS SEG NFR BLD AUTO: 55 % (ref 43–75)
NRBC BLD AUTO-RTO: 0 /100 WBCS
PLATELET # BLD AUTO: 414 THOUSANDS/UL (ref 149–390)
PMV BLD AUTO: 10.7 FL (ref 8.9–12.7)
RBC # BLD AUTO: 3.64 MILLION/UL (ref 3.81–5.12)
TIBC SERPL-MCNC: 376 UG/DL (ref 250–450)
WBC # BLD AUTO: 7.53 THOUSAND/UL (ref 4.31–10.16)

## 2022-06-09 PROCEDURE — 83550 IRON BINDING TEST: CPT

## 2022-06-09 PROCEDURE — 82728 ASSAY OF FERRITIN: CPT

## 2022-06-09 PROCEDURE — 36415 COLL VENOUS BLD VENIPUNCTURE: CPT

## 2022-06-09 PROCEDURE — 83540 ASSAY OF IRON: CPT

## 2022-06-09 PROCEDURE — 85025 COMPLETE CBC W/AUTO DIFF WBC: CPT

## 2022-06-10 ENCOUNTER — TELEPHONE (OUTPATIENT)
Dept: GASTROENTEROLOGY | Facility: MEDICAL CENTER | Age: 57
End: 2022-06-10

## 2022-06-10 NOTE — TELEPHONE ENCOUNTER
----- Message from Jaelyn Vargas PA-C sent at 6/10/2022  9:46 AM EDT -----  Please inform patient that her hemoglobin is coming up which is great news  She should continue iron over-the-counter    Thank you

## 2022-07-26 DIAGNOSIS — K21.9 GASTROESOPHAGEAL REFLUX DISEASE: ICD-10-CM

## 2022-07-26 RX ORDER — PANTOPRAZOLE SODIUM 40 MG/1
TABLET, DELAYED RELEASE ORAL
Qty: 90 TABLET | Refills: 3 | Status: SHIPPED | OUTPATIENT
Start: 2022-07-26

## 2022-12-21 DIAGNOSIS — K51.919 ULCERATIVE COLITIS WITH COMPLICATION, UNSPECIFIED LOCATION (HCC): ICD-10-CM

## 2022-12-23 DIAGNOSIS — K51.919 ULCERATIVE COLITIS WITH COMPLICATION, UNSPECIFIED LOCATION (HCC): ICD-10-CM

## 2022-12-27 RX ORDER — ADALIMUMAB 40MG/0.4ML
KIT SUBCUTANEOUS
Qty: 2 EACH | Refills: 11 | Status: SHIPPED | OUTPATIENT
Start: 2022-12-27

## 2022-12-29 ENCOUNTER — TELEPHONE (OUTPATIENT)
Dept: GASTROENTEROLOGY | Facility: CLINIC | Age: 57
End: 2022-12-29

## 2022-12-29 NOTE — TELEPHONE ENCOUNTER
[id number] [facility] ON [facility] [id number]

## 2023-01-17 ENCOUNTER — TELEPHONE (OUTPATIENT)
Dept: GASTROENTEROLOGY | Facility: CLINIC | Age: 58
End: 2023-01-17

## 2023-01-17 NOTE — TELEPHONE ENCOUNTER
Patients GI provider:  MERRITT Feng    Number to return call: [phone]     Reason for call: Pt called and would like a call back from [facility]  Regarding her  medication and insurance change   please reach out thank you     Scheduled procedure/appointment date if applicable: n/a

## 2023-01-17 NOTE — TELEPHONE ENCOUNTER
Called pt back  Pt got  and now income level changed  Pt wants to know what insurance do we take that will cover her Humira and GI visits so she can purchase the appropriate insurance    Routing to IBD team

## 2023-01-19 NOTE — TELEPHONE ENCOUNTER
Lmovm that I cant give her a list of insurances we accept  We accept many  I did advise her to talk to the companies and ask if [address] is listed and if Humira is on the formulary  Once she has new ins I asked she let us know so I can work on  getting the [employer] approved

## 2023-07-31 DIAGNOSIS — K21.9 GASTROESOPHAGEAL REFLUX DISEASE: ICD-10-CM

## 2023-07-31 RX ORDER — PANTOPRAZOLE SODIUM 40 MG/1
40 TABLET, DELAYED RELEASE ORAL DAILY
Qty: 90 TABLET | Refills: 0 | Status: SHIPPED | OUTPATIENT
Start: 2023-07-31

## 2023-07-31 NOTE — TELEPHONE ENCOUNTER
Received Rx refill request   Pharmacy: [facility]   Medication: Pantoprazole SOD DR 40 MG Tab  Scanned to chart:  Yes

## 2023-08-29 ENCOUNTER — TELEPHONE (OUTPATIENT)
Age: 58
End: 2023-08-29

## 2023-08-29 NOTE — TELEPHONE ENCOUNTER
Patients GI provider:  Nat Chiu    Number to return call: [phone]    Reason for call: Pt called in requesting to speak to Nat Chiu in regards to a letter for the Dept of  for the [facility] program. She has several questions as well.      Scheduled procedure/appointment date if applicable: Appt 47/51/55

## 2023-09-19 ENCOUNTER — TELEPHONE (OUTPATIENT)
Dept: GASTROENTEROLOGY | Facility: CLINIC | Age: 58
End: 2023-09-19

## 2023-09-29 ENCOUNTER — APPOINTMENT (OUTPATIENT)
Dept: LAB | Facility: CLINIC | Age: 58
End: 2023-09-29
Payer: COMMERCIAL

## 2023-09-29 DIAGNOSIS — Z00.00 ROUTINE GENERAL MEDICAL EXAMINATION AT A HEALTH CARE FACILITY: ICD-10-CM

## 2023-09-29 DIAGNOSIS — K51.519 LEFT SIDED COLITIS WITH COMPLICATION (HCC): ICD-10-CM

## 2023-09-29 LAB
ALBUMIN SERPL BCP-MCNC: 4.2 G/DL (ref 3.5–5)
ALP SERPL-CCNC: 86 U/L (ref 34–104)
ALT SERPL W P-5'-P-CCNC: 20 U/L (ref 7–52)
ANION GAP SERPL CALCULATED.3IONS-SCNC: 7 MMOL/L
AST SERPL W P-5'-P-CCNC: 19 U/L (ref 13–39)
BASOPHILS # BLD AUTO: 0.07 THOUSANDS/ÂΜL (ref 0–0.1)
BASOPHILS NFR BLD AUTO: 1 % (ref 0–1)
BILIRUB SERPL-MCNC: 0.5 MG/DL (ref 0.2–1)
BUN SERPL-MCNC: 17 MG/DL (ref 5–25)
CALCIUM SERPL-MCNC: 9.8 MG/DL (ref 8.4–10.2)
CHLORIDE SERPL-SCNC: 108 MMOL/L (ref 96–108)
CHOLEST SERPL-MCNC: 199 MG/DL
CO2 SERPL-SCNC: 28 MMOL/L (ref 21–32)
CREAT SERPL-MCNC: 0.96 MG/DL (ref 0.6–1.3)
CRP SERPL QL: 2.8 MG/L
EOSINOPHIL # BLD AUTO: 0.17 THOUSAND/ÂΜL (ref 0–0.61)
EOSINOPHIL NFR BLD AUTO: 3 % (ref 0–6)
ERYTHROCYTE [DISTWIDTH] IN BLOOD BY AUTOMATED COUNT: 13.5 % (ref 11.6–15.1)
ERYTHROCYTE [SEDIMENTATION RATE] IN BLOOD: 14 MM/HOUR (ref 0–29)
EST. AVERAGE GLUCOSE BLD GHB EST-MCNC: 120 MG/DL
FERRITIN SERPL-MCNC: 50 NG/ML (ref 11–307)
GFR SERPL CREATININE-BSD FRML MDRD: 65 ML/MIN/1.73SQ M
GLUCOSE P FAST SERPL-MCNC: 80 MG/DL (ref 65–99)
HBA1C MFR BLD: 5.8 %
HCT VFR BLD AUTO: 40.5 % (ref 34.8–46.1)
HDLC SERPL-MCNC: 71 MG/DL
HGB BLD-MCNC: 13.3 G/DL (ref 11.5–15.4)
IMM GRANULOCYTES # BLD AUTO: 0.02 THOUSAND/UL (ref 0–0.2)
IMM GRANULOCYTES NFR BLD AUTO: 0 % (ref 0–2)
IRON SATN MFR SERPL: 31 % (ref 15–50)
IRON SERPL-MCNC: 108 UG/DL (ref 50–212)
LDLC SERPL CALC-MCNC: 114 MG/DL (ref 0–100)
LYMPHOCYTES # BLD AUTO: 2.06 THOUSANDS/ÂΜL (ref 0.6–4.47)
LYMPHOCYTES NFR BLD AUTO: 30 % (ref 14–44)
MCH RBC QN AUTO: 30.6 PG (ref 26.8–34.3)
MCHC RBC AUTO-ENTMCNC: 32.8 G/DL (ref 31.4–37.4)
MCV RBC AUTO: 93 FL (ref 82–98)
MONOCYTES # BLD AUTO: 0.46 THOUSAND/ÂΜL (ref 0.17–1.22)
MONOCYTES NFR BLD AUTO: 7 % (ref 4–12)
NEUTROPHILS # BLD AUTO: 4.09 THOUSANDS/ÂΜL (ref 1.85–7.62)
NEUTS SEG NFR BLD AUTO: 59 % (ref 43–75)
NONHDLC SERPL-MCNC: 128 MG/DL
NRBC BLD AUTO-RTO: 0 /100 WBCS
PLATELET # BLD AUTO: 349 THOUSANDS/UL (ref 149–390)
PMV BLD AUTO: 10.8 FL (ref 8.9–12.7)
POTASSIUM SERPL-SCNC: 4.3 MMOL/L (ref 3.5–5.3)
PROT SERPL-MCNC: 7.8 G/DL (ref 6.4–8.4)
RBC # BLD AUTO: 4.35 MILLION/UL (ref 3.81–5.12)
SODIUM SERPL-SCNC: 143 MMOL/L (ref 135–147)
TIBC SERPL-MCNC: 344 UG/DL (ref 250–450)
TRIGL SERPL-MCNC: 70 MG/DL
TSH SERPL DL<=0.05 MIU/L-ACNC: 1.4 UIU/ML (ref 0.45–4.5)
UIBC SERPL-MCNC: 236 UG/DL (ref 155–355)
WBC # BLD AUTO: 6.87 THOUSAND/UL (ref 4.31–10.16)

## 2023-09-29 PROCEDURE — 86140 C-REACTIVE PROTEIN: CPT

## 2023-09-29 PROCEDURE — 84443 ASSAY THYROID STIM HORMONE: CPT

## 2023-09-29 PROCEDURE — 80061 LIPID PANEL: CPT

## 2023-09-29 PROCEDURE — 83540 ASSAY OF IRON: CPT

## 2023-09-29 PROCEDURE — 80053 COMPREHEN METABOLIC PANEL: CPT

## 2023-09-29 PROCEDURE — 83550 IRON BINDING TEST: CPT

## 2023-09-29 PROCEDURE — 82728 ASSAY OF FERRITIN: CPT

## 2023-09-29 PROCEDURE — 85652 RBC SED RATE AUTOMATED: CPT

## 2023-09-29 PROCEDURE — 83036 HEMOGLOBIN GLYCOSYLATED A1C: CPT

## 2023-09-29 PROCEDURE — 36415 COLL VENOUS BLD VENIPUNCTURE: CPT

## 2023-09-29 PROCEDURE — 85025 COMPLETE CBC W/AUTO DIFF WBC: CPT

## 2023-10-06 RX ORDER — FLUOXETINE 10 MG/1
10 CAPSULE ORAL DAILY
COMMUNITY
Start: 2023-09-11

## 2023-10-11 ENCOUNTER — OFFICE VISIT (OUTPATIENT)
Dept: GASTROENTEROLOGY | Facility: CLINIC | Age: 58
End: 2023-10-11
Payer: COMMERCIAL

## 2023-10-11 VITALS
HEART RATE: 65 BPM | HEIGHT: 59 IN | WEIGHT: 129.6 LBS | DIASTOLIC BLOOD PRESSURE: 90 MMHG | BODY MASS INDEX: 26.13 KG/M2 | OXYGEN SATURATION: 98 % | SYSTOLIC BLOOD PRESSURE: 140 MMHG

## 2023-10-11 DIAGNOSIS — K51.919 ULCERATIVE COLITIS WITH COMPLICATION, UNSPECIFIED LOCATION (HCC): ICD-10-CM

## 2023-10-11 PROCEDURE — 99213 OFFICE O/P EST LOW 20 MIN: CPT | Performed by: PHYSICIAN ASSISTANT

## 2023-10-11 RX ORDER — DIPHENOXYLATE HYDROCHLORIDE AND ATROPINE SULFATE 2.5; .025 MG/1; MG/1
2 TABLET ORAL 4 TIMES DAILY PRN
Qty: 60 TABLET | Refills: 3 | Status: SHIPPED | OUTPATIENT
Start: 2023-10-11

## 2023-10-11 NOTE — PROGRESS NOTES
[facility] - Outpatient Follow-up Note  Timmy [place] 62 y.o. female MRN: [id number]  Encounter: [id number]          ASSESSMENT AND PLAN:      1. Ulcerative colitis with complication, unspecified location ([address])  -Continue Humira every other week. -Patient is due for repeat colonoscopy in .  -Updated laboratories reviewed. -IBD recommendations:  -Avoid live virus vaccines  -Yearly flu shot  -COVID vaccine and booster  -Pneumonia vaccine  -Shingrix  -Routine skin exams with the dermatologist    -Follow-up in 6 months.  ______________________________________________________________________    SUBJECTIVE:    55-year-old female with a past medical history significant for ulcerative colitis in remission on Humira every other week. Patient reports that she is currently maintained on Humira every other week and is doing quite well. She does report occasional episodes of diarrhea. She denies any abdominal pain. She denies any rectal bleeding or melena. She denies any nausea or vomiting. Colonoscopy from 2021 shows quiescent ulcerative colitis. CBC, CRP, ESR, CMP from 2023 were normal.    REVIEW OF SYSTEMS IS OTHERWISE NEGATIVE.       Historical Information   Past Medical History:   Diagnosis Date    Anxiety     Colitis     Ulcerative colitis ([address])      Past Surgical History:   Procedure Laterality Date     SECTION      CHOLECYSTECTOMY      COLONOSCOPY      TOOTH EXTRACTION      TUBAL LIGATION       Social History   Social History     Substance and Sexual Activity   Alcohol Use Yes    Alcohol/week: 4.0 standard drinks of alcohol    Types: 4 Cans of beer per week     Social History     Substance and Sexual Activity   Drug Use No     Social History     Tobacco Use   Smoking Status Never   Smokeless Tobacco Never     Family History   Problem Relation Age of Onset    No Known Problems Mother     Stroke Father     Hypertension Father        Meds/Allergies Current Outpatient Medications:     Adalimumab (Humira Pen) 40 MG/0.4ML PNKT    Adalimumab (Humira Pen) 40 MG/0.4ML PNKT    clobetasol (TEMOVATE) 0.05 % cream    clonazePAM (KlonoPIN) 0.5 mg tablet    dicyclomine (BENTYL) 20 mg tablet    diphenoxylate-atropine (Lomotil) 2.5-0.025 mg per tablet    ibuprofen (MOTRIN) 600 mg tablet    pantoprazole (PROTONIX) 40 mg tablet    chlorhexidine (PERIDEX) 0.12 % solution    citalopram (CeleXA) 10 mg tablet    FLUoxetine (PROzac) 10 mg capsule    predniSONE 10 mg tablet    Allergies   Allergen Reactions    Thimerosal Other (See Comments)    Thimerosal (Thiomersal) Hives           Objective     Blood pressure 140/90, pulse 65, height 4' 11" (1.499 m), weight 58.8 kg (129 lb 9.6 oz), last menstrual period 07/04/2021, SpO2 98 %. Body mass index is 26.18 kg/m². PHYSICAL EXAM:      General Appearance:   Alert, cooperative, no distress   HEENT:   Normocephalic, atraumatic, anicteric. Neck:  Supple, symmetrical, trachea midline   Lungs:   Clear to auscultation bilaterally; no rales, rhonchi or wheezing; respirations unlabored    Heart[de-identified]   Regular rate and rhythm; no murmur, rub, or gallop. Abdomen:   Soft, non-tender, non-distended; normal bowel sounds; no masses, no organomegaly    Genitalia:   Deferred    Rectal:   Deferred    Extremities:  No cyanosis, clubbing or edema    Pulses:  2+ and symmetric    Skin:  No jaundice, rashes, or lesions    Lymph nodes:  No palpable cervical lymphadenopathy        Lab Results:   No visits with results within 1 Day(s) from this visit.    Latest known visit with results is:   Appointment on 09/29/2023   Component Date Value    WBC 09/29/2023 6.87     RBC 09/29/2023 4.35     Hemoglobin 09/29/2023 13.3     Hematocrit 09/29/2023 40.5     MCV 09/29/2023 93     MCH 09/29/2023 30.6     MCHC 09/29/2023 32.8     RDW 09/29/2023 13.5     MPV 09/29/2023 10.8     Platelets 80/67/4177 349     nRBC 09/29/2023 0     Neutrophils Relative 09/29/2023 59     Immat GRANS % 09/29/2023 0     Lymphocytes Relative 09/29/2023 30     Monocytes Relative 09/29/2023 7     Eosinophils Relative 09/29/2023 3     Basophils Relative 09/29/2023 1     Neutrophils Absolute 09/29/2023 4.09     Immature Grans Absolute 09/29/2023 0.02     Lymphocytes Absolute 09/29/2023 2.06     Monocytes Absolute 09/29/2023 0.46     Eosinophils Absolute 09/29/2023 0.17     Basophils Absolute 09/29/2023 0.07     Sodium 09/29/2023 143     Potassium 09/29/2023 4.3     Chloride 09/29/2023 108     CO2 09/29/2023 28     ANION GAP 09/29/2023 7     BUN 09/29/2023 17     Creatinine 09/29/2023 0.96     Glucose, Fasting 09/29/2023 80     Calcium 09/29/2023 9.8     AST 09/29/2023 19     ALT 09/29/2023 20     Alkaline Phosphatase 09/29/2023 86     Total Protein 09/29/2023 7.8     Albumin 09/29/2023 4.2     Total Bilirubin 09/29/2023 0.50     eGFR 09/29/2023 65     Cholesterol 09/29/2023 199     Triglycerides 09/29/2023 70     HDL, Direct 09/29/2023 71     LDL Calculated 09/29/2023 114 (H)     Non-HDL-Chol (CHOL-HDL) 09/29/2023 128     Hemoglobin A1C 09/29/2023 5.8 (H)     EAG 09/29/2023 120     TSH 3RD GENERATON 09/29/2023 1.403     Ferritin 09/29/2023 50     Iron Saturation 09/29/2023 31     TIBC 09/29/2023 344     Iron 09/29/2023 108     UIBC 09/29/2023 236     Sed Rate 09/29/2023 14     CRP 09/29/2023 2.8          Radiology Results:   DXA BONE DENSITY SCAN STANDARD 2 SITES    Result Date: 10/4/2023  Narrative: PROCEDURE: DXA bone mineral density examination was performed with a Hologic scanner. CLINICAL HISTORY: This is a 62year old postmenopausal female that requires a bone density assessment. BONE DENSITY: Total Lumbar Spine: 0.971 grams/cm2 which correlates with a Z-score 0.6 and a T-score -0.7, classification of Normal. Femoral Neck (Left): 0.726 grams/cm2 which correlates with a Z-score 0.1 and a T-score -1.1, classification of Osteopenia.  Total Hip (Left): 0.858 grams/cm2 which correlates with a Z-score 0.2 and a T-score -0.7, classification of Normal. A valid comparison for the lumbar spine and left hip cannot be made due to dissimilar scan types or analysis methods when compared to the baseline study from 2021. WHO Fracture Assessment Tool (FRAX) estimates 10 year fracture risk as follows: Major Osteoporotic Fracture: 4.9% Hip Fracture: 0.3% Interpretation: The patient has osteopenia of the left femoral neck as determined by WHO criteria. Based on the results of the patient's bone density assessment, the risk of future fracture increases approximately two fold for each 1.0 SD decrease in T-score. Assessment: A repeat bone density assessment should be considered in two years. The National Osteoporosis Foundation Recommends That FDA Approved Medical Therapies Be Considered In Postmenopausal Women And Men Age Greater Than Or Equal To 48 Years With The Followin. Hip Or Vertebral Fracture; 2.  T-Score Of Less Than Or Equal To -2.5 At The Spine Or Hip; 3.  Osteopenia And 10-Year Fracture Risk Probability By Julia Mosley Of Greater than Or Equal to 20% For Major Osteoporotic Fracture and Greater than Or Equal to 3% For Hip Fracture. Comment: All Treatment Decisions, Including Pharmacologic Treatment, Require Clinical Judgment and Consideration Of Individual Patient Factors, Including Patient Preferences, Comorbidities, Previous Drug Use And Risk Factors not captured in the [facility], e.g. Fragility, Falls, Vitamin D Deficiency, Increased Bone turnover, and Interval Significant Decline In Bone Mineral Density. Workstation:[id number]    XR HIP 2 TO 3 VIEWS WITH PELVIS LEFT    Result Date: 2023  Narrative: EXAMINATION:  Frontal view of the pelvis and 2 views of the left hip INDICATION: Left hip pain. COMPARISON:  None. FINDINGS:  No evidence of acute fracture or dislocation is seen. Joint spaces are maintained. The sacroiliac joints are intact.     Impression: IMPRESSION:  No acute bony abnormality identified. Workstation:[id number]    MAMMO 3D TOMOSYNTHESIS SCREENING BILAT W/CAD    Result Date: 9/12/2023  Narrative: HISTORY:  Patient is 62years old and is seen for screening. The patient has a history of right excisional biopsy in 1984 - benign. FILMS COMPARED:  Prior imaging studies performed at [facility] on 02/17/2009, 05/04/2011, 05/23/2011, 03/09/2012, 04/04/2013, 04/22/2014 and 04/23/2015, at [facility] on 05/18/2017, 06/07/2018, 06/13/2019, 07/16/2020 and 08/30/2021, at [facility] on 09/06/2022, and at [facility] on 05/11/2016 were reviewed. MAMMOGRAM FINDINGS:  The following digital mammographic views were obtained: bilateral craniocaudal, bilateral mediolateral oblique, bilateral 3D tomosynthesis was performed   Computer-aided detection was utilized by the radiologist in the interpretation of this examination. There are scattered fibroglandular densities. No suspicious masses, calcifications or other abnormalities are seen. Impression: IMPRESSION:  There is no mammographic evidence of malignancy. Routine follow-up mammogram in 1 year is recommended.   BI-RADS Category 1:  Negative WS Code: Aaliyah Arn

## 2023-11-14 DIAGNOSIS — K21.9 GASTROESOPHAGEAL REFLUX DISEASE: ICD-10-CM

## 2023-11-14 RX ORDER — PANTOPRAZOLE SODIUM 40 MG/1
40 TABLET, DELAYED RELEASE ORAL DAILY
Qty: 90 TABLET | Refills: 1 | Status: SHIPPED | OUTPATIENT
Start: 2023-11-14

## 2023-11-14 NOTE — TELEPHONE ENCOUNTER
Reason for call:   [x] Refill   [] Prior Auth  [] Other:     Office:   [] PCP/Provider -   [x] Specialty/Provider - Susana     Medication: Pantoprazole     Dose/Frequency: 40mg QD     Quantity: 90    Pharmacy: [address] [place] [address]     Does the patient have enough for 3 days?    [x] Yes   [] No - Send as HP to POD

## 2023-12-13 DIAGNOSIS — K51.919 ULCERATIVE COLITIS WITH COMPLICATION, UNSPECIFIED LOCATION (HCC): ICD-10-CM

## 2023-12-13 RX ORDER — ADALIMUMAB 40MG/0.4ML
KIT SUBCUTANEOUS
Qty: 2 EACH | Refills: 11 | Status: SHIPPED | OUTPATIENT
Start: 2023-12-13

## 2023-12-13 NOTE — TELEPHONE ENCOUNTER
Reason for call:   [x] Refill   [] Prior Auth  [] Other:     Office:   [] PCP/Provider -   [x] Specialty/Provider - GI / Aundria Acampo    Medication: Adalimumab (Humira Pen) 40 MG/0.4ML PNKT     Dose/Frequency: Inject 40 mg subcutaneously every 14 (fourteen) days     Quantity: 2    Pharmacy: [facility]     Does the patient have enough for 3 days?    [x] Yes   [] No - Send as HP to POD

## 2024-01-24 ENCOUNTER — TELEPHONE (OUTPATIENT)
Dept: GASTROENTEROLOGY | Facility: CLINIC | Age: 59
End: 2024-01-24

## 2024-03-15 ENCOUNTER — TELEPHONE (OUTPATIENT)
Age: 59
End: 2024-03-15

## 2024-03-15 NOTE — TELEPHONE ENCOUNTER
I called and spoke with the patient. Pt has new insurance as of March 1st 2024.    Advised patient to take pictures of front and back new insurance card and upload onto [employer] for our authorization team to start new authorization for Humira. Pt states has 3 pens of Humira in her refrigerator for future dosing until auth is completed.    Also advised patient to take pictures of patient assistance forms and upload onto [employer] for our authroization team to review.

## 2024-03-15 NOTE — TELEPHONE ENCOUNTER
Patients GI provider:  ELEAZAR Meza    Number to return call: [phone]    Reason for call: Pt called in asking if there was another way to have the Humira forms from  [employer] sent over to the office. Patient states she has new insurance and will need to have forms filled out by the office. Patient does not have a fax machine. Please reach out to patient at the number above, thank you.    Scheduled procedure/appointment date if applicable: Apt/procedure 04/09/2024

## 2024-03-19 NOTE — TELEPHONE ENCOUNTER
3/19 IZABELLA DE LA VEGA Key: [id number] - [place] Case ID: [id number]  Need help? Call us at [phone]  Outcome   Approvedon March 18  Meets Pharmacy Policy

## 2024-03-22 ENCOUNTER — TELEPHONE (OUTPATIENT)
Dept: GASTROENTEROLOGY | Facility: CLINIC | Age: 59
End: 2024-03-22

## 2024-03-22 NOTE — TELEPHONE ENCOUNTER
Received approval letter for patients SHELLY. Will scan into chart and then fax to [facility].....

## 2024-04-08 DIAGNOSIS — K51.919 ULCERATIVE COLITIS WITH COMPLICATION, UNSPECIFIED LOCATION (HCC): ICD-10-CM

## 2024-04-08 NOTE — TELEPHONE ENCOUNTER
Reason for call:   [x] Refill   [] Prior Auth  [] Other:     Office:   [] PCP/Provider -   [x] Specialty/Provider - GI    Medication: Adalimumab (Humira Pen) 40 MG/0.4ML PNKT     Dose/Frequency: Inject 40 mg subcutaneously every 14 (fourteen) days     Quantity: 2    Pharmacy: [address] [phone]     Does the patient have enough for 3 days?   [x] Yes   [] No - Send as HP to POD

## 2024-04-09 ENCOUNTER — OFFICE VISIT (OUTPATIENT)
Dept: GASTROENTEROLOGY | Facility: CLINIC | Age: 59
End: 2024-04-09
Payer: COMMERCIAL

## 2024-04-09 VITALS
HEIGHT: 59 IN | BODY MASS INDEX: 25.84 KG/M2 | TEMPERATURE: 97.8 F | WEIGHT: 128.2 LBS | HEART RATE: 78 BPM | OXYGEN SATURATION: 99 % | SYSTOLIC BLOOD PRESSURE: 110 MMHG | DIASTOLIC BLOOD PRESSURE: 80 MMHG

## 2024-04-09 DIAGNOSIS — K21.9 GASTROESOPHAGEAL REFLUX DISEASE: ICD-10-CM

## 2024-04-09 DIAGNOSIS — K21.9 GASTROESOPHAGEAL REFLUX DISEASE WITHOUT ESOPHAGITIS: ICD-10-CM

## 2024-04-09 DIAGNOSIS — K51.919 ULCERATIVE COLITIS WITH COMPLICATION, UNSPECIFIED LOCATION (HCC): Primary | ICD-10-CM

## 2024-04-09 PROCEDURE — 99213 OFFICE O/P EST LOW 20 MIN: CPT | Performed by: PHYSICIAN ASSISTANT

## 2024-04-09 RX ORDER — DIPHENOXYLATE HYDROCHLORIDE AND ATROPINE SULFATE 2.5; .025 MG/1; MG/1
2 TABLET ORAL 4 TIMES DAILY PRN
Qty: 60 TABLET | Refills: 3 | Status: SHIPPED | OUTPATIENT
Start: 2024-04-09

## 2024-04-09 RX ORDER — PANTOPRAZOLE SODIUM 40 MG/1
40 TABLET, DELAYED RELEASE ORAL 2 TIMES DAILY
Qty: 180 TABLET | Refills: 2 | Status: SHIPPED | OUTPATIENT
Start: 2024-04-09

## 2024-04-09 RX ORDER — ADALIMUMAB 40MG/0.4ML
KIT SUBCUTANEOUS
Qty: 2 EACH | Refills: 0 | Status: SHIPPED | OUTPATIENT
Start: 2024-04-09

## 2024-04-09 NOTE — PROGRESS NOTES
[facility] - Outpatient Follow-up Note  Saba Mckeon 58 y.o. female MRN: [id number]  Encounter: [id number]          ASSESSMENT AND PLAN:      1. Ulcerative colitis with complication, unspecified location (HCC)  -In remission    -Will update laboratories.    -Patient will continue Humira every other week.    -Patient is due for repeat colonoscopy in September 2024.    -IBD recommendations:  -Avoid live virus vaccines  -Yearly flu shot  -COVID vaccine and booster  -Pneumonia vaccine  -Shingrix  -Routine skin exams with the dermatologist    2. Gastroesophageal reflux disease without esophagitis  -Patient will increase her pantoprazole 40 mg to twice daily dose.    -Patient will continue probiotic daily.    -Follow-up in 6 months.  ______________________________________________________________________    SUBJECTIVE:    58-year-old female with a past medical history significant for ulcerative colitis, anxiety, GERD who presents to the GI clinic today for follow-up.    Overall patient is doing quite well.  In regard to her ulcerative colitis she is maintained on Humira every other week.  She is reporting normal bowel movements.  She denies any melena or rectal bleeding.  Unfortunately she is still suffering with certain foods.  She reports that certain foods are causing epigastric abdominal discomfort as well as gas.  Patient denies any dysphagia.  She is currently on pantoprazole 40 mg daily.  She does take a probiotic as needed.    Patient's last colonoscopy was in 2021 and did show evidence of quiescent ulcerative colitis.  Patient's last upper endoscopy was in 2022 and this was normal.  Patient is due to have updated laboratories performed.  Patient does need to have her Humira refilled as well as her Lomotil.    REVIEW OF SYSTEMS IS OTHERWISE NEGATIVE.      Historical Information   Past Medical History:   Diagnosis Date    Anxiety     Colitis     Ulcerative colitis (HCC)      Past  "Surgical History:   Procedure Laterality Date     SECTION      CHOLECYSTECTOMY      COLONOSCOPY      TOOTH EXTRACTION      TUBAL LIGATION       Social History   Social History     Substance and Sexual Activity   Alcohol Use Yes    Alcohol/week: 4.0 standard drinks of alcohol    Types: 4 Cans of beer per week     Social History     Substance and Sexual Activity   Drug Use No     Social History     Tobacco Use   Smoking Status Never   Smokeless Tobacco Never     Family History   Problem Relation Age of Onset    No Known Problems Mother     Stroke Father     Hypertension Father        Meds/Allergies       Current Outpatient Medications:     Adalimumab (Humira Pen) 40 MG/0.4ML PNKT    Adalimumab (Humira Pen) 40 MG/0.4ML PNKT    clobetasol (TEMOVATE) 0.05 % cream    dicyclomine (BENTYL) 20 mg tablet    diphenoxylate-atropine (Lomotil) 2.5-0.025 mg per tablet    ibuprofen (MOTRIN) 600 mg tablet    pantoprazole (PROTONIX) 40 mg tablet    chlorhexidine (PERIDEX) 0.12 % solution    citalopram (CeleXA) 10 mg tablet    clonazePAM (KlonoPIN) 0.5 mg tablet    FLUoxetine (PROzac) 10 mg capsule    predniSONE 10 mg tablet    Allergies   Allergen Reactions    Thimerosal Other (See Comments)    Thimerosal (Thiomersal) Hives           Objective     Blood pressure 110/80, pulse 78, temperature 97.8 °F (36.6 °C), temperature source Temporal, height 4' 11\" (1.499 m), weight 58.2 kg (128 lb 3.2 oz), last menstrual period 2021, SpO2 99%. Body mass index is 25.89 kg/m².      PHYSICAL EXAM:      General Appearance:   Alert, cooperative, no distress   HEENT:   Normocephalic, atraumatic, anicteric.     Neck:  Supple, symmetrical, trachea midline   Lungs:   Clear to auscultation bilaterally; no rales, rhonchi or wheezing; respirations unlabored    Heart::   Regular rate and rhythm; no murmur, rub, or gallop.   Abdomen:   Soft, non-tender, non-distended; normal bowel sounds; no masses, no organomegaly    Genitalia:   Deferred  "   Rectal:   Deferred    Extremities:  No cyanosis, clubbing or edema    Pulses:  2+ and symmetric    Skin:  No jaundice, rashes, or lesions    Lymph nodes:  No palpable cervical lymphadenopathy        Lab Results:   No visits with results within 1 Day(s) from this visit.   Latest known visit with results is:   Appointment on 09/29/2023   Component Date Value    WBC 09/29/2023 6.87     RBC 09/29/2023 4.35     Hemoglobin 09/29/2023 13.3     Hematocrit 09/29/2023 40.5     MCV 09/29/2023 93     MCH 09/29/2023 30.6     MCHC 09/29/2023 32.8     RDW 09/29/2023 13.5     MPV 09/29/2023 10.8     Platelets 09/29/2023 349     nRBC 09/29/2023 0     Segmented % 09/29/2023 59     Immature Grans % 09/29/2023 0     Lymphocytes % 09/29/2023 30     Monocytes % 09/29/2023 7     Eosinophils Relative 09/29/2023 3     Basophils Relative 09/29/2023 1     Absolute Neutrophils 09/29/2023 4.09     Absolute Immature Grans 09/29/2023 0.02     Absolute Lymphocytes 09/29/2023 2.06     Absolute Monocytes 09/29/2023 0.46     Eosinophils Absolute 09/29/2023 0.17     Basophils Absolute 09/29/2023 0.07     Sodium 09/29/2023 143     Potassium 09/29/2023 4.3     Chloride 09/29/2023 108     CO2 09/29/2023 28     ANION GAP 09/29/2023 7     BUN 09/29/2023 17     Creatinine 09/29/2023 0.96     Glucose, Fasting 09/29/2023 80     Calcium 09/29/2023 9.8     AST 09/29/2023 19     ALT 09/29/2023 20     Alkaline Phosphatase 09/29/2023 86     Total Protein 09/29/2023 7.8     Albumin 09/29/2023 4.2     Total Bilirubin 09/29/2023 0.50     eGFR 09/29/2023 65     Cholesterol 09/29/2023 199     Triglycerides 09/29/2023 70     HDL, Direct 09/29/2023 71     LDL Calculated 09/29/2023 114 (H)     Non-HDL-Chol (CHOL-HDL) 09/29/2023 128     Hemoglobin A1C 09/29/2023 5.8 (H)     EAG 09/29/2023 120     TSH 3RD GENERATON 09/29/2023 1.403     Ferritin 09/29/2023 50     Iron Saturation 09/29/2023 31     TIBC 09/29/2023 344     Iron 09/29/2023 108     UIBC 09/29/2023 236     Sed  Rate 09/29/2023 14     CRP 09/29/2023 2.8          Radiology Results:   No results found.

## 2024-04-09 NOTE — PATIENT INSTRUCTIONS
Ulcerative Colitis   WHAT YOU NEED TO KNOW:   Ulcerative colitis is a chronic disease of the colon (large intestine). Inflammation and ulcers form on the inner lining of your colon. Ulcerative colitis is a type of inflammatory bowel disease. You may have times when signs and symptoms will decrease or disappear (remission). You will need to continue treatment in times of remission.       DISCHARGE INSTRUCTIONS:   Call, or have someone call, your local emergency number (911 in the [place]) if:   You have sudden trouble breathing.    You have a fast heart rate, fast breathing, or are too dizzy to stand up.    Return to the emergency department if:   You have severe abdominal pain.    Your vomit has blood in it or looks like coffee grounds.    You see bright red blood in your bowel movement.    Call your doctor if:   Your symptoms return.     You have questions or concerns about your condition or care.    Medicines:   Medicines  may be given to help decrease inflammation or control your immune system.    Take your medicine as directed.  Contact your healthcare provider if you think your medicine is not helping or if you have side effects. Tell your provider if you are allergic to any medicine. Keep a list of the medicines, vitamins, and herbs you take. Include the amounts, and when and why you take them. Bring the list or the pill bottles to follow-up visits. Carry your medicine list with you in case of an emergency.    Self-care:   Do not take NSAID medicines , including aspirin and ibuprofen. NSAIDs can cause flare-ups.    Eat a variety of healthy foods  to keep your colon healthy. Healthy foods include fruit, vegetables, whole-grain breads, low-fat dairy products, beans, lean meat, and fish. Do not eat foods that make your symptoms worse. Your healthcare provider may give you vitamins or minerals to improve your nutrition if you have severe ulcerative colitis.     Drink liquids as directed.  Ask how much liquid to drink  each day and which liquids are best for you. For most people, water, juice, and milk are good choices. Do not drink alcohol. This can make your symptoms worse.    Exercise regularly.  Ask about the best exercise plan for you. Any activity is better than none. Even 10 minutes a few times a day would help prevent constipation and help keep your colon healthy.         Manage stress.  Stress may slow healing and cause illness. Learn new ways to relax, such as deep breathing.    Follow up with your doctor as directed:  Keep a written record of your bowel movements. Include the color, form, and if they were bloody. Bring the record to your follow-up visits. Write down your questions so you remember to ask them during your visits.  © Copyright [employer] 2023 Information is for End User's use only and may not be sold, redistributed or otherwise used for commercial purposes.  The above information is an  only. It is not intended as medical advice for individual conditions or treatments. Talk to your doctor, nurse or pharmacist before following any medical regimen to see if it is safe and effective for you.  Scheduled date of colonoscopy (as of today):10/1/24  Physician performing colonoscopy:Deneen  Location of colonoscopy:[facility]  Bowel prep reviewed with patient:Dulco/Miralax  Instructions reviewed with patient by:Azael coello  Clearances:  none

## 2024-04-10 ENCOUNTER — TELEPHONE (OUTPATIENT)
Dept: GASTROENTEROLOGY | Facility: CLINIC | Age: 59
End: 2024-04-10

## 2024-04-10 NOTE — TELEPHONE ENCOUNTER
Patients GI provider:  Dr. Feng    Number to return call: [phone]    Reason for call: Pt calling to have form resubmitted to mary with the date. States she forgot to put the date so it was denied. Please let pt know once resubmitted.     Scheduled procedure/appointment date if applicable: procedure 10/1

## 2024-04-12 ENCOUNTER — APPOINTMENT (OUTPATIENT)
Dept: LAB | Facility: CLINIC | Age: 59
End: 2024-04-12
Payer: COMMERCIAL

## 2024-04-12 DIAGNOSIS — K21.9 GASTROESOPHAGEAL REFLUX DISEASE WITHOUT ESOPHAGITIS: ICD-10-CM

## 2024-04-12 DIAGNOSIS — K21.9 GASTROESOPHAGEAL REFLUX DISEASE: ICD-10-CM

## 2024-04-12 LAB
ALBUMIN SERPL BCP-MCNC: 4.3 G/DL (ref 3.5–5)
ALP SERPL-CCNC: 75 U/L (ref 34–104)
ALT SERPL W P-5'-P-CCNC: 10 U/L (ref 7–52)
ANION GAP SERPL CALCULATED.3IONS-SCNC: 9 MMOL/L (ref 4–13)
AST SERPL W P-5'-P-CCNC: 17 U/L (ref 13–39)
BILIRUB SERPL-MCNC: 0.44 MG/DL (ref 0.2–1)
BUN SERPL-MCNC: 20 MG/DL (ref 5–25)
CALCIUM SERPL-MCNC: 9.2 MG/DL (ref 8.4–10.2)
CHLORIDE SERPL-SCNC: 105 MMOL/L (ref 96–108)
CO2 SERPL-SCNC: 26 MMOL/L (ref 21–32)
CREAT SERPL-MCNC: 0.86 MG/DL (ref 0.6–1.3)
CRP SERPL QL: 3.6 MG/L
ERYTHROCYTE [DISTWIDTH] IN BLOOD BY AUTOMATED COUNT: 13.4 % (ref 11.6–15.1)
ERYTHROCYTE [SEDIMENTATION RATE] IN BLOOD: 17 MM/HOUR (ref 0–29)
GFR SERPL CREATININE-BSD FRML MDRD: 74 ML/MIN/1.73SQ M
GLUCOSE SERPL-MCNC: 90 MG/DL (ref 65–140)
HCT VFR BLD AUTO: 37.8 % (ref 34.8–46.1)
HGB BLD-MCNC: 12.4 G/DL (ref 11.5–15.4)
MCH RBC QN AUTO: 30.6 PG (ref 26.8–34.3)
MCHC RBC AUTO-ENTMCNC: 32.8 G/DL (ref 31.4–37.4)
MCV RBC AUTO: 93 FL (ref 82–98)
PLATELET # BLD AUTO: 336 THOUSANDS/UL (ref 149–390)
PMV BLD AUTO: 11.3 FL (ref 8.9–12.7)
POTASSIUM SERPL-SCNC: 4 MMOL/L (ref 3.5–5.3)
PROT SERPL-MCNC: 7.2 G/DL (ref 6.4–8.4)
RBC # BLD AUTO: 4.05 MILLION/UL (ref 3.81–5.12)
SODIUM SERPL-SCNC: 140 MMOL/L (ref 135–147)
WBC # BLD AUTO: 6.03 THOUSAND/UL (ref 4.31–10.16)

## 2024-04-12 PROCEDURE — 80053 COMPREHEN METABOLIC PANEL: CPT

## 2024-04-12 PROCEDURE — 85652 RBC SED RATE AUTOMATED: CPT

## 2024-04-12 PROCEDURE — 86140 C-REACTIVE PROTEIN: CPT

## 2024-04-12 PROCEDURE — 85027 COMPLETE CBC AUTOMATED: CPT

## 2024-04-12 PROCEDURE — 36415 COLL VENOUS BLD VENIPUNCTURE: CPT

## 2024-04-12 PROCEDURE — 80145 DRUG ASSAY ADALIMUMAB: CPT

## 2024-04-12 PROCEDURE — 82397 CHEMILUMINESCENT ASSAY: CPT

## 2024-04-26 LAB
ADALIMUMAB AB SERPL-MCNC: 7677 NG/ML
ADALIMUMAB SERPL-MCNC: <0.6 UG/ML

## 2024-05-02 ENCOUNTER — TELEMEDICINE (OUTPATIENT)
Dept: GASTROENTEROLOGY | Facility: CLINIC | Age: 59
End: 2024-05-02
Payer: COMMERCIAL

## 2024-05-02 DIAGNOSIS — K51.919 ULCERATIVE COLITIS WITH COMPLICATION, UNSPECIFIED LOCATION (HCC): Primary | ICD-10-CM

## 2024-05-02 PROCEDURE — 99213 OFFICE O/P EST LOW 20 MIN: CPT | Performed by: PHYSICIAN ASSISTANT

## 2024-05-02 RX ORDER — MESALAMINE 1.2 G/1
1200 TABLET, DELAYED RELEASE ORAL 4 TIMES DAILY
Qty: 360 TABLET | Refills: 3 | Status: SHIPPED | OUTPATIENT
Start: 2024-05-02 | End: 2025-04-27

## 2024-05-02 NOTE — PROGRESS NOTES
Virtual Brief Visit    This Visit is being completed by telephone. The Patient is located at Home and in the following state in which I hold an active license [place]    The patient was identified by name and date of birth. Saba Mckeon was informed that this is a telemedicine visit and that the visit is being conducted through Telephone.  My office door was closed. No one else was in the room.  She acknowledged consent and understanding of privacy and security of the video platform. The patient has agreed to participate and understands they can discontinue the visit at any time.    Patient is aware this is a billable service.     58-year-old female with a past medical history significant for ulcerative colitis in remission.  Unfortunately patient's most recent laboratories it shows that she does have significant antibodies to Humira with no evidence of drug.      Patient reports that overall she is actually feeling well.  She reports that she is only having a flare of diarrhea and abdominal cramping when she is stressed.  She denies any melena or rectal bleeding.  Her CRP and ESR were essentially unremarkable.  Her CMP and CBC were normal.  Patient is scheduled for routine colonoscopy with biopsies in October 2024.  Patient wishes to hold off on transitioning to any other biologic medication until her repeat colonoscopy is completed.  Golf last night    1. Ulcerative colitis with complication, unspecified location (HCC)  In remission.    Will discontinue Humira.    Will begin Lialda 4 pills every morning.  Patient wishes to hold off on any other treatment until her colonoscopy is completed in October.    Continue to monitor ESR and CRP.    Patient will contact the office if she does have a flare of her UC to include abdominal pain, diarrhea, rectal bleeding.        Assessment/Plan:    Problem List Items Addressed This Visit       Ulcerative colitis (HCC) - Primary    Relevant Medications    mesalamine (LIALDA) 1.2 g  EC tablet       Recent Visits  No visits were found meeting these conditions.  Showing recent visits within past 7 days and meeting all other requirements  Future Appointments  No visits were found meeting these conditions.  Showing future appointments within next 150 days and meeting all other requirements         Visit Time  Total Visit Duration: 15 mins.

## 2024-06-02 NOTE — PATIENT INSTRUCTIONS
Colitis   WHAT YOU NEED TO KNOW:   Colitis is swelling and irritation of your colon. Colitis may be caused by ulcers or a problem with your immune system. Bacteria, a virus, or a parasite may also cause colitis. The cause may not be known. You may have diarrhea, abdominal pain, fever, or blood or mucus in your bowel movement.  DISCHARGE INSTRUCTIONS:   Return to the emergency department if:   You have sudden trouble breathing.    Your bowel movements are black or have blood in them.    You have blood in your vomit.    You have severe abdominal pain or your abdomen is swollen and feels hard.    You have any of the following signs of dehydration:     Dizziness or weakness    Dry mouth, cracked lips, or severe thirst    Fast heartbeat or breathing    Urinating very little or not at all    Call your doctor if:   Your symptoms get worse or do not go away.    You have a fever, chills, cough, or feel weak and achy.    You suddenly lose weight without trying.    You have questions or concerns about your condition or care.    Medicines:   Medicines  may be given to decrease inflammation in your colon and treat diarrhea.    Take your medicine as directed.  Contact your healthcare provider if you think your medicine is not helping or if you have side effects. Tell your provider if you are allergic to any medicine. Keep a list of the medicines, vitamins, and herbs you take. Include the amounts, and when and why you take them. Bring the list or the pill bottles to follow-up visits. Carry your medicine list with you in case of an emergency.    Manage your symptoms:   Drink liquids as directed to help prevent dehydration.  Good liquids to drink include water, juice, and broth. Ask how much liquid to drink each day. You may need to drink an oral rehydration solution (ORS). An ORS contains a balance of water, salt, and sugar to replace body fluids lost during diarrhea.    Eat a variety of healthy foods.  Healthy foods include  fruits, vegetables, whole-grain breads, beans, low-fat dairy products, lean meats, and fish. You may need to eat several small meals throughout the day instead of large meals. Avoid spicy foods, caffeine, chocolate, and foods high in fat.    Talk to your healthcare provider before you take NSAIDs.  NSAIDs can cause worsen your symptoms if ulcers are causing your colitis.    Start to exercise when you feel better.  Regular exercise helps your bowels work normally. Ask about the best exercise plan for you.    Prevent the spread of germs:       Wash your hands often.  Wash your hands several times each day. Wash after you use the bathroom, change a child's diaper, and before you prepare or eat food. Use soap and water every time. Rub your soapy hands together, lacing your fingers. Wash the front and back of your hands, and in between your fingers. Use the fingers of one hand to scrub under the fingernails of the other hand. Wash for at least 20 seconds. Rinse with warm, running water for several seconds. Then dry your hands with a clean towel or paper towel. Use hand  that contains alcohol if soap and water are not available. Do not touch your eyes, nose, or mouth without washing your hands first.         Cover a sneeze or cough.  Use a tissue that covers your mouth and nose. Throw the tissue away in a trash can right away. Use the bend of your arm if a tissue is not available. Wash your hands well with soap and water or use a hand .    Clean surfaces often.  Clean doorknobs, countertops, cell phones, and other surfaces that are touched often. Use a disinfecting wipe, a single-use sponge, or a cloth you can wash and reuse. Use disinfecting  if you do not have wipes. You can create a disinfecting  by mixing 1 part bleach with 10 parts water.    Ask about vaccines you may need.  Vaccines help prevent disease caused by some viruses and bacteria. Get the influenza (flu) vaccine as soon as  recommended each year. The flu vaccine is usually available starting in September or October. Flu viruses change, so it is important to get a flu vaccine every year. Get the pneumonia vaccine if recommended. This vaccine is usually recommended every 5 years. Your provider will tell you when to get this vaccine, if needed. Your healthcare provider can tell you if you should get other vaccines, and when to get them.  Follow up with your doctor as directed:  You may need to return for a colonoscopy or other tests. Write down how often you have a bowel movements and what they look like. Bring this to your follow-up visits. Write down your questions so you remember to ask them during your visits.  © Copyright [employer] 2023 Information is for End User's use only and may not be sold, redistributed or otherwise used for commercial purposes.  The above information is an  only. It is not intended as medical advice for individual conditions or treatments. Talk to your doctor, nurse or pharmacist before following any medical regimen to see if it is safe and effective for you.     Attending Attestation (For Attendings USE Only)...

## 2024-07-02 ENCOUNTER — HOSPITAL ENCOUNTER (OUTPATIENT)
Dept: GASTROENTEROLOGY | Facility: HOSPITAL | Age: 59
Setting detail: OUTPATIENT SURGERY
Discharge: HOME | End: 2024-07-02
Payer: COMMERCIAL

## 2024-07-02 ENCOUNTER — ANESTHESIA EVENT (OUTPATIENT)
Dept: GASTROENTEROLOGY | Facility: HOSPITAL | Age: 59
End: 2024-07-02

## 2024-07-02 ENCOUNTER — ANESTHESIA (OUTPATIENT)
Dept: GASTROENTEROLOGY | Facility: HOSPITAL | Age: 59
End: 2024-07-02

## 2024-07-02 VITALS
WEIGHT: 122.36 LBS | DIASTOLIC BLOOD PRESSURE: 79 MMHG | SYSTOLIC BLOOD PRESSURE: 123 MMHG | HEART RATE: 50 BPM | TEMPERATURE: 97 F | OXYGEN SATURATION: 99 % | HEIGHT: 59 IN | BODY MASS INDEX: 24.67 KG/M2 | RESPIRATION RATE: 16 BRPM

## 2024-07-02 DIAGNOSIS — K51.919 ULCERATIVE COLITIS WITH COMPLICATION, UNSPECIFIED LOCATION (HCC): ICD-10-CM

## 2024-07-02 PROCEDURE — 45380 COLONOSCOPY AND BIOPSY: CPT | Performed by: INTERNAL MEDICINE

## 2024-07-02 PROCEDURE — 88305 TISSUE EXAM BY PATHOLOGIST: CPT | Performed by: PATHOLOGY

## 2024-07-02 RX ORDER — PROPOFOL 10 MG/ML
INJECTION, EMULSION INTRAVENOUS AS NEEDED
Status: DISCONTINUED | OUTPATIENT
Start: 2024-07-02 | End: 2024-07-02

## 2024-07-02 RX ORDER — SODIUM CHLORIDE, SODIUM LACTATE, POTASSIUM CHLORIDE, CALCIUM CHLORIDE 600; 310; 30; 20 MG/100ML; MG/100ML; MG/100ML; MG/100ML
50 INJECTION, SOLUTION INTRAVENOUS CONTINUOUS
Status: DISCONTINUED | OUTPATIENT
Start: 2024-07-02 | End: 2024-07-06 | Stop reason: HOSPADM

## 2024-07-02 RX ORDER — LIDOCAINE HYDROCHLORIDE 20 MG/ML
INJECTION, SOLUTION EPIDURAL; INFILTRATION; INTRACAUDAL; PERINEURAL AS NEEDED
Status: DISCONTINUED | OUTPATIENT
Start: 2024-07-02 | End: 2024-07-02

## 2024-07-02 RX ADMIN — LIDOCAINE HYDROCHLORIDE 50 MG: 20 INJECTION, SOLUTION EPIDURAL; INFILTRATION; INTRACAUDAL; PERINEURAL at 13:07

## 2024-07-02 RX ADMIN — SODIUM CHLORIDE, SODIUM LACTATE, POTASSIUM CHLORIDE, AND CALCIUM CHLORIDE 50 ML/HR: .6; .31; .03; .02 INJECTION, SOLUTION INTRAVENOUS at 12:08

## 2024-07-02 RX ADMIN — PROPOFOL 100 MG: 10 INJECTION, EMULSION INTRAVENOUS at 13:07

## 2024-07-02 RX ADMIN — PROPOFOL 50 MG: 10 INJECTION, EMULSION INTRAVENOUS at 13:10

## 2024-07-02 RX ADMIN — PROPOFOL 50 MG: 10 INJECTION, EMULSION INTRAVENOUS at 13:13

## 2024-07-02 NOTE — ANESTHESIA POSTPROCEDURE EVALUATION
Post-Op Assessment Note    CV Status:  Stable    Pain management: adequate       Mental Status:  Alert and awake   Hydration Status:  Euvolemic   PONV Controlled:  Controlled   Airway Patency:  Patent     Post Op Vitals Reviewed: Yes    No anethesia notable event occurred.    Staff: CAROLA               BP 93/55 (07/02/24 1324)    Temp (!) 97 °F (36.1 °C) (07/02/24 1324)    Pulse 68 (07/02/24 1324)   Resp 18 (07/02/24 1324)    SpO2 98 % (07/02/24 1324)

## 2024-07-02 NOTE — H&P
"History and Physical -  [facility]  Saba Mckeon 58 y.o. female MRN: [id number]      HPI: Saba Mckeon is a 58 y.o. year old female who presents for evaluation of her ulcerative colitis      REVIEW OF SYSTEMS: Per the HPI, and otherwise unremarkable.    Historical Information   Past Medical History:   Diagnosis Date    Anxiety     Colitis     Ulcerative colitis (HCC)      Past Surgical History:   Procedure Laterality Date     SECTION      CHOLECYSTECTOMY      COLONOSCOPY      CYST REMOVAL      TOOTH EXTRACTION      TUBAL LIGATION       Social History   Social History     Substance and Sexual Activity   Alcohol Use Yes    Alcohol/week: 4.0 standard drinks of alcohol    Types: 4 Cans of beer per week    Comment: twice a week     Social History     Substance and Sexual Activity   Drug Use No     Social History     Tobacco Use   Smoking Status Never   Smokeless Tobacco Never     Family History   Problem Relation Age of Onset    No Known Problems Mother     Stroke Father     Hypertension Father        Meds/Allergies     Not in a hospital admission.    Allergies   Allergen Reactions    Thimerosal Other (See Comments)    Thimerosal (Thiomersal) Hives       Objective     Blood pressure 135/71, pulse 68, temperature 97.7 °F (36.5 °C), temperature source Temporal, resp. rate 16, height 4' 11\" (1.499 m), weight 55.5 kg (122 lb 5.7 oz), last menstrual period 2021, SpO2 100%.      PHYSICAL EXAM    Gen: NAD  CV: RRR  CHEST: Clear  ABD: soft, NT/ND  EXT: no edema      ASSESSMENT/PLAN:  This is a 58 y.o. year old female here for colonoscopy, and she is stable and optimized for her procedure.          "

## 2024-07-02 NOTE — ANESTHESIA PREPROCEDURE EVALUATION
Procedure:  COLONOSCOPY    Relevant Problems   NEURO/PSYCH   (+) Anxiety      FEN/Gastrointestinal   (+) Ulcerative colitis (HCC)        Physical Exam    Airway    Mallampati score: II  TM Distance: >3 FB  Neck ROM: full     Dental       Cardiovascular  Cardiovascular exam normal    Pulmonary  Pulmonary exam normal     Other Findings  post-pubertal.      Anesthesia Plan  ASA Score- 3     Anesthesia Type- IV sedation with anesthesia with ASA Monitors.         Additional Monitors:     Airway Plan:            Plan Factors-Exercise tolerance (METS): >4 METS.    Chart reviewed. EKG reviewed. Imaging results reviewed. Existing labs reviewed. Patient summary reviewed.    Patient is not a current smoker.              Induction- intravenous.    Postoperative Plan-         Informed Consent- Anesthetic plan and risks discussed with patient.  I personally reviewed this patient with the CRNA. Discussed and agreed on the Anesthesia Plan with the CRNA..

## 2024-07-05 PROCEDURE — 88305 TISSUE EXAM BY PATHOLOGIST: CPT | Performed by: PATHOLOGY

## 2024-07-16 ENCOUNTER — TELEPHONE (OUTPATIENT)
Dept: GASTROENTEROLOGY | Facility: CLINIC | Age: 59
End: 2024-07-16

## 2024-07-16 NOTE — TELEPHONE ENCOUNTER
Called pt and LMOM with biopsies results and to call us back if any questions.  Office # provided.

## 2024-07-16 NOTE — TELEPHONE ENCOUNTER
----- Message from Richard Brothers DO sent at 7/15/2024  5:08 PM EDT -----  Patient has not read [email or web address] message. Please call and share results.      Saba,     Biopsies taken in the colon showed some chronic active colitis with no evidence of cancer.

## 2024-11-06 ENCOUNTER — OFFICE VISIT (OUTPATIENT)
Dept: GASTROENTEROLOGY | Facility: CLINIC | Age: 59
End: 2024-11-06
Payer: COMMERCIAL

## 2024-11-06 VITALS
OXYGEN SATURATION: 99 % | HEART RATE: 66 BPM | WEIGHT: 128.6 LBS | SYSTOLIC BLOOD PRESSURE: 127 MMHG | TEMPERATURE: 97.6 F | BODY MASS INDEX: 25.92 KG/M2 | HEIGHT: 59 IN | DIASTOLIC BLOOD PRESSURE: 79 MMHG

## 2024-11-06 DIAGNOSIS — K51.919 ULCERATIVE COLITIS WITH COMPLICATION, UNSPECIFIED LOCATION (HCC): Primary | ICD-10-CM

## 2024-11-06 PROCEDURE — 99213 OFFICE O/P EST LOW 20 MIN: CPT | Performed by: PHYSICIAN ASSISTANT

## 2024-11-06 RX ORDER — HYDROXYZINE HYDROCHLORIDE 25 MG/1
25-50 TABLET, FILM COATED ORAL DAILY PRN
COMMUNITY
Start: 2024-10-01

## 2024-11-06 NOTE — ASSESSMENT & PLAN NOTE
Will reach out to our IBD team to help in navigating treatment options.    Will update ESR, CRP, CBC, CMP.    Patient's colonoscopy from July was reviewed in detail.  Patient has been off all medication to include mesalamine and Humira since that time.  Luckily she has not flared.    She will continue probiotic therapy for now.    Unfortunately due to cost and insurance issues she cannot receive any medications for the treatment of her IBD at this time.

## 2024-11-06 NOTE — PROGRESS NOTES
Ambulatory Visit  Name: Saba Mckeon      : 1965      MRN: [id number]  Encounter Provider: Ping Meza PA-C  Encounter Date: 2024   Encounter department: [facility] [place]    Assessment & Plan  Ulcerative colitis with complication, unspecified location (HCC)  Will reach out to our IBD team to help in navigating treatment options.    Will update ESR, CRP, CBC, CMP.    Patient's colonoscopy from July was reviewed in detail.  Patient has been off all medication to include mesalamine and Humira since that time.  Luckily she has not flared.    She will continue probiotic therapy for now.    Unfortunately due to cost and insurance issues she cannot receive any medications for the treatment of her IBD at this time.        History of Present Illness     Saba Mckeon is a 59 y.o. female who presents with a past medical history significant for UC.  Patient recently had a colonoscopy in July that showed evidence of diffuse colitis.  Biopsies were consistent.  Luckily patient has not flare despite being off Humira and mesalamine.  Patient has failed Remicade.  Patient cannot tolerate Uceris.  Unfortunately due to patient's insurance and cost it has been almost impossible to get patient maintained on a medication consistently.  She is reporting several stools a day.  She denies any melena or rectal bleeding.  She denies any severe abdominal pain but does report abdominal gas and bloating.  She denies any nausea or vomiting.  Her appetite is good and her weight has been stable.      Review of Systems   Constitutional:  Negative for chills and fever.   HENT:  Negative for ear pain and sore throat.    Eyes:  Negative for pain and visual disturbance.   Respiratory:  Negative for cough and shortness of breath.    Cardiovascular:  Negative for chest pain and palpitations.   Gastrointestinal:  Negative for abdominal pain and vomiting.   Genitourinary:  Negative for dysuria and  "hematuria.   Musculoskeletal:  Negative for arthralgias and back pain.   Skin:  Negative for color change and rash.   Neurological:  Negative for seizures and syncope.   All other systems reviewed and are negative.          Objective     /79 (BP Location: Left arm, Patient Position: Sitting, Cuff Size: Standard)   Pulse 66   Temp 97.6 °F (36.4 °C) (Temporal)   Ht 4' 11\" (1.499 m)   Wt 58.3 kg (128 lb 9.6 oz)   LMP 07/04/2021 (Approximate)   SpO2 99%   BMI 25.97 kg/m²     Physical Exam    "

## 2024-11-08 ENCOUNTER — TELEPHONE (OUTPATIENT)
Dept: GASTROENTEROLOGY | Facility: CLINIC | Age: 59
End: 2024-11-08

## 2024-11-08 DIAGNOSIS — Z11.59 ENCOUNTER FOR SCREENING FOR OTHER VIRAL DISEASES: ICD-10-CM

## 2024-11-08 DIAGNOSIS — K51.919 ULCERATIVE COLITIS WITH COMPLICATION, UNSPECIFIED LOCATION (HCC): Primary | ICD-10-CM

## 2024-11-08 NOTE — TELEPHONE ENCOUNTER
----- Message from Ping Meza PA-C sent at 11/8/2024 10:30 AM EST -----  I say we go with Stelara! Thank you!  ----- Message -----  From: Graciela Briseno MA  Sent: 11/7/2024   7:45 AM EST  To: Ping Meza PA-C    Hi Tif,  Looking at her ins without requesting a full Benefit Investigation yet cause that can take a few days I would say either med would work for her financially, they each have a good copay program for Commercial insurance patients that would help cover the cost of the injections so her copay should be only $5 each time. Since she had a colonoscopy this year I'm betting she already met her deductible for the year. If that's the case then getting the Stelara 1 time infusion in before end of year would be best. Skyrizi has 3 infusions so she would have to pay her deductible early in the new year. Lemme know how you want to proceed.  ----- Message -----  From: Graciela Briseno MA  Sent: 11/7/2024   7:25 AM EST  To: Graciela Briseno MA      ----- Message -----  From: Ping Meza PA-C  Sent: 11/6/2024   4:23 PM EST  To: [facility]    Hi team.    I am in a little predicament with this patient. She does not have a great insurance plan and cost is a big issue.     I recently stopped her Humira due to high antibodies. She can not afford any mesalamine.     Remicade was a failure.   Uceris was a failure    I would love for her to start Stelara or Skyrizi? Any insight you might have in regard to her insurance will be super helpful for me!     Thank you all so much!  Tiff

## 2024-12-10 DIAGNOSIS — K51.819 OTHER ULCERATIVE COLITIS WITH COMPLICATION (HCC): Primary | ICD-10-CM

## 2024-12-10 RX ORDER — SODIUM CHLORIDE 9 MG/ML
20 INJECTION, SOLUTION INTRAVENOUS ONCE
OUTPATIENT
Start: 2024-12-12

## 2024-12-12 ENCOUNTER — HOSPITAL ENCOUNTER (OUTPATIENT)
Dept: INFUSION CENTER | Facility: CLINIC | Age: 59
End: 2024-12-12

## 2025-01-19 DIAGNOSIS — K21.9 GASTROESOPHAGEAL REFLUX DISEASE WITHOUT ESOPHAGITIS: ICD-10-CM

## 2025-01-19 DIAGNOSIS — K21.9 GASTROESOPHAGEAL REFLUX DISEASE: ICD-10-CM

## 2025-01-20 RX ORDER — PANTOPRAZOLE SODIUM 40 MG/1
40 TABLET, DELAYED RELEASE ORAL 2 TIMES DAILY
Qty: 180 TABLET | Refills: 1 | Status: SHIPPED | OUTPATIENT
Start: 2025-01-20

## 2025-01-24 ENCOUNTER — TELEPHONE (OUTPATIENT)
Dept: INFUSION CENTER | Facility: CLINIC | Age: 60
End: 2025-01-24

## 2025-01-27 ENCOUNTER — TELEPHONE (OUTPATIENT)
Dept: GASTROENTEROLOGY | Facility: CLINIC | Age: 60
End: 2025-01-27

## 2025-01-27 NOTE — TELEPHONE ENCOUNTER
Spoke to pt to remind her to get labs done for upcoming appt with Ping on 1/28/25. Pt is having issues with her insurance and asked to cancel her office appt on 1/29/25 and infusion appt 1/28/25 at 8:30. Spoke to Radha at infusion center and appt was cancelled. Pt will call back to reschedule.    Appt for 1/29/25 was cancelled.      Male

## 2025-01-28 ENCOUNTER — HOSPITAL ENCOUNTER (OUTPATIENT)
Dept: INFUSION CENTER | Facility: CLINIC | Age: 60
Discharge: HOME | End: 2025-01-28